# Patient Record
Sex: FEMALE | Race: WHITE | NOT HISPANIC OR LATINO | Employment: UNEMPLOYED | URBAN - METROPOLITAN AREA
[De-identification: names, ages, dates, MRNs, and addresses within clinical notes are randomized per-mention and may not be internally consistent; named-entity substitution may affect disease eponyms.]

---

## 2017-09-08 ENCOUNTER — HISTORICAL (OUTPATIENT)
Dept: RADIOLOGY | Facility: HOSPITAL | Age: 72
End: 2017-09-08

## 2017-09-22 ENCOUNTER — HISTORICAL (OUTPATIENT)
Dept: RADIOLOGY | Facility: HOSPITAL | Age: 72
End: 2017-09-22

## 2018-04-19 ENCOUNTER — HISTORICAL (OUTPATIENT)
Dept: RADIOLOGY | Facility: HOSPITAL | Age: 73
End: 2018-04-19

## 2018-10-12 ENCOUNTER — HISTORICAL (OUTPATIENT)
Dept: RADIOLOGY | Facility: HOSPITAL | Age: 73
End: 2018-10-12

## 2018-10-19 ENCOUNTER — HISTORICAL (OUTPATIENT)
Dept: LAB | Facility: HOSPITAL | Age: 73
End: 2018-10-19

## 2018-10-31 ENCOUNTER — HISTORICAL (OUTPATIENT)
Dept: RADIOLOGY | Facility: HOSPITAL | Age: 73
End: 2018-10-31

## 2018-11-02 ENCOUNTER — HISTORICAL (OUTPATIENT)
Dept: RADIOLOGY | Facility: HOSPITAL | Age: 73
End: 2018-11-02

## 2019-01-28 ENCOUNTER — HISTORICAL (OUTPATIENT)
Dept: RADIOLOGY | Facility: HOSPITAL | Age: 74
End: 2019-01-28

## 2020-01-30 ENCOUNTER — HISTORICAL (OUTPATIENT)
Dept: RADIOLOGY | Facility: HOSPITAL | Age: 75
End: 2020-01-30

## 2020-03-06 ENCOUNTER — HISTORICAL (OUTPATIENT)
Dept: RADIOLOGY | Facility: HOSPITAL | Age: 75
End: 2020-03-06

## 2020-11-02 ENCOUNTER — HISTORICAL (OUTPATIENT)
Dept: RADIOLOGY | Facility: HOSPITAL | Age: 75
End: 2020-11-02

## 2020-11-02 LAB
ALBUMIN SERPL-MCNC: 4 GM/DL (ref 3.4–4.8)
ALP SERPL-CCNC: 81 UNIT/L (ref 40–150)
ALT SERPL-CCNC: 90 UNIT/L (ref 0–55)
AST SERPL-CCNC: 97 UNIT/L (ref 5–34)
BILIRUB SERPL-MCNC: 0.5 MG/DL
BILIRUBIN DIRECT+TOT PNL SERPL-MCNC: 0.2 MG/DL (ref 0–0.5)
BILIRUBIN DIRECT+TOT PNL SERPL-MCNC: 0.3 MG/DL (ref 0–0.8)
PROT SERPL-MCNC: 7.9 GM/DL (ref 5.8–7.6)

## 2021-02-17 ENCOUNTER — HISTORICAL (OUTPATIENT)
Dept: RADIOLOGY | Facility: HOSPITAL | Age: 76
End: 2021-02-17

## 2021-08-05 ENCOUNTER — HISTORICAL (OUTPATIENT)
Dept: LAB | Facility: HOSPITAL | Age: 76
End: 2021-08-05

## 2021-08-09 ENCOUNTER — HISTORICAL (OUTPATIENT)
Dept: RADIOLOGY | Facility: HOSPITAL | Age: 76
End: 2021-08-09

## 2021-09-28 ENCOUNTER — HISTORICAL (OUTPATIENT)
Dept: RADIOLOGY | Facility: HOSPITAL | Age: 76
End: 2021-09-28

## 2022-04-07 ENCOUNTER — HISTORICAL (OUTPATIENT)
Dept: ADMINISTRATIVE | Facility: HOSPITAL | Age: 77
End: 2022-04-07

## 2022-07-26 ENCOUNTER — HOSPITAL ENCOUNTER (OUTPATIENT)
Dept: RADIOLOGY | Facility: HOSPITAL | Age: 77
Discharge: HOME | End: 2022-07-26
Attending: FAMILY MEDICINE
Payer: MEDICARE

## 2022-07-26 DIAGNOSIS — G89.29 CHRONIC BACK PAIN: ICD-10-CM

## 2022-07-26 DIAGNOSIS — M54.9 CHRONIC BACK PAIN: ICD-10-CM

## 2022-07-26 PROCEDURE — 72100 X-RAY EXAM L-S SPINE 2/3 VWS: CPT | Mod: TC

## 2022-07-26 PROCEDURE — 72070 X-RAY EXAM THORAC SPINE 2VWS: CPT | Mod: TC

## 2022-08-19 ENCOUNTER — HOSPITAL ENCOUNTER (OUTPATIENT)
Dept: RADIOLOGY | Facility: HOSPITAL | Age: 77
Discharge: HOME | End: 2022-08-19
Attending: FAMILY MEDICINE
Payer: MEDICARE

## 2022-08-19 DIAGNOSIS — Z00.00 ROUTINE CHECK-UP: ICD-10-CM

## 2022-08-19 PROCEDURE — 71046 X-RAY EXAM CHEST 2 VIEWS: CPT | Mod: TC

## 2023-11-24 ENCOUNTER — APPOINTMENT (OUTPATIENT)
Dept: LAB | Facility: HOSPITAL | Age: 78
End: 2023-11-24
Attending: FAMILY MEDICINE
Payer: MEDICARE

## 2023-11-24 DIAGNOSIS — N39.0 URINARY TRACT INFECTION, SITE NOT SPECIFIED: Primary | ICD-10-CM

## 2023-11-24 LAB
APPEARANCE UR: ABNORMAL
BACTERIA #/AREA URNS AUTO: ABNORMAL /HPF
BILIRUB UR QL STRIP.AUTO: NEGATIVE
COLOR UR AUTO: ABNORMAL
GLUCOSE UR QL STRIP.AUTO: NEGATIVE
KETONES UR QL STRIP.AUTO: ABNORMAL
LEUKOCYTE ESTERASE UR QL STRIP.AUTO: ABNORMAL
MUCOUS THREADS URNS QL MICRO: ABNORMAL /LPF
NITRITE UR QL STRIP.AUTO: NEGATIVE
PH UR STRIP.AUTO: 6 [PH]
PROT UR QL STRIP.AUTO: ABNORMAL
RBC #/AREA URNS AUTO: ABNORMAL /HPF
RBC UR QL AUTO: ABNORMAL
SP GR UR STRIP.AUTO: 1.01 (ref 1–1.03)
SQUAMOUS #/AREA URNS AUTO: ABNORMAL /HPF
UROBILINOGEN UR STRIP-ACNC: 0.2
WBC #/AREA URNS AUTO: >100 /HPF

## 2023-11-24 PROCEDURE — 81001 URINALYSIS AUTO W/SCOPE: CPT

## 2023-11-24 PROCEDURE — 87086 URINE CULTURE/COLONY COUNT: CPT

## 2023-11-26 LAB
BACTERIA UR CULT: ABNORMAL

## 2023-12-01 ENCOUNTER — HOSPITAL ENCOUNTER (INPATIENT)
Facility: HOSPITAL | Age: 78
LOS: 12 days | Discharge: HOSPICE | DRG: 689 | End: 2023-12-13
Attending: FAMILY MEDICINE | Admitting: FAMILY MEDICINE
Payer: MEDICARE

## 2023-12-01 DIAGNOSIS — N39.0 UTI (URINARY TRACT INFECTION): ICD-10-CM

## 2023-12-01 DIAGNOSIS — R41.82 MENTAL STATUS ALTERATION: Primary | ICD-10-CM

## 2023-12-01 DIAGNOSIS — I10 PRIMARY HYPERTENSION: ICD-10-CM

## 2023-12-01 DIAGNOSIS — R07.9 CHEST PAIN: ICD-10-CM

## 2023-12-01 LAB
ALBUMIN SERPL-MCNC: 3.4 G/DL (ref 3.4–4.8)
ALBUMIN/GLOB SERPL: 0.7 RATIO (ref 1.1–2)
ALP SERPL-CCNC: 117 UNIT/L (ref 40–150)
ALT SERPL-CCNC: 61 UNIT/L (ref 0–55)
APPEARANCE UR: ABNORMAL
AST SERPL-CCNC: 71 UNIT/L (ref 5–34)
BACTERIA #/AREA URNS AUTO: ABNORMAL /HPF
BASOPHILS # BLD AUTO: 0.02 X10(3)/MCL
BASOPHILS NFR BLD AUTO: 0.3 %
BILIRUB SERPL-MCNC: 0.4 MG/DL
BILIRUB UR QL STRIP.AUTO: NEGATIVE
BUN SERPL-MCNC: 25 MG/DL (ref 9.8–20.1)
CALCIUM SERPL-MCNC: 10.2 MG/DL (ref 8.4–10.2)
CHLORIDE SERPL-SCNC: 104 MMOL/L (ref 98–107)
CO2 SERPL-SCNC: 27 MMOL/L (ref 23–31)
COLOR UR AUTO: ABNORMAL
CREAT SERPL-MCNC: 1.2 MG/DL (ref 0.55–1.02)
EOSINOPHIL # BLD AUTO: 0.16 X10(3)/MCL (ref 0–0.9)
EOSINOPHIL NFR BLD AUTO: 2.1 %
ERYTHROCYTE [DISTWIDTH] IN BLOOD BY AUTOMATED COUNT: 13.8 % (ref 11.5–17)
ERYTHROCYTE [SEDIMENTATION RATE] IN BLOOD: 23 MM/HR (ref 0–20)
FLUAV AG UPPER RESP QL IA.RAPID: NOT DETECTED
FLUBV AG UPPER RESP QL IA.RAPID: NOT DETECTED
GFR SERPLBLD CREATININE-BSD FMLA CKD-EPI: 46 MLS/MIN/1.73/M2
GLOBULIN SER-MCNC: 4.9 GM/DL (ref 2.4–3.5)
GLUCOSE SERPL-MCNC: 128 MG/DL (ref 82–115)
GLUCOSE UR QL STRIP.AUTO: NEGATIVE
HCT VFR BLD AUTO: 41.4 % (ref 37–47)
HGB BLD-MCNC: 13.3 G/DL (ref 12–16)
IMM GRANULOCYTES # BLD AUTO: 0.02 X10(3)/MCL (ref 0–0.04)
IMM GRANULOCYTES NFR BLD AUTO: 0.3 %
KETONES UR QL STRIP.AUTO: ABNORMAL
LEUKOCYTE ESTERASE UR QL STRIP.AUTO: ABNORMAL
LYMPHOCYTES # BLD AUTO: 2.15 X10(3)/MCL (ref 0.6–4.6)
LYMPHOCYTES NFR BLD AUTO: 28.5 %
MAGNESIUM SERPL-MCNC: 1.9 MG/DL (ref 1.6–2.6)
MCH RBC QN AUTO: 30 PG (ref 27–31)
MCHC RBC AUTO-ENTMCNC: 32.1 G/DL (ref 33–36)
MCV RBC AUTO: 93.5 FL (ref 80–94)
MONOCYTES # BLD AUTO: 0.51 X10(3)/MCL (ref 0.1–1.3)
MONOCYTES NFR BLD AUTO: 6.8 %
MUCOUS THREADS URNS QL MICRO: ABNORMAL /LPF
NEUTROPHILS # BLD AUTO: 4.68 X10(3)/MCL (ref 2.1–9.2)
NEUTROPHILS NFR BLD AUTO: 62 %
NITRITE UR QL STRIP.AUTO: NEGATIVE
PH UR STRIP.AUTO: 6 [PH]
PHOSPHATE SERPL-MCNC: 3.9 MG/DL (ref 2.3–4.7)
PLATELET # BLD AUTO: 195 X10(3)/MCL (ref 130–400)
PMV BLD AUTO: 10 FL (ref 7.4–10.4)
POCT GLUCOSE: 132 MG/DL (ref 70–110)
POTASSIUM SERPL-SCNC: 4 MMOL/L (ref 3.5–5.1)
PROT SERPL-MCNC: 8.3 GM/DL (ref 5.8–7.6)
PROT UR QL STRIP.AUTO: ABNORMAL
RBC # BLD AUTO: 4.43 X10(6)/MCL (ref 4.2–5.4)
RBC #/AREA URNS AUTO: ABNORMAL /HPF
RBC UR QL AUTO: ABNORMAL
SARS-COV-2 RNA RESP QL NAA+PROBE: NOT DETECTED
SODIUM SERPL-SCNC: 143 MMOL/L (ref 136–145)
SP GR UR STRIP.AUTO: 1.02 (ref 1–1.03)
SQUAMOUS #/AREA URNS AUTO: ABNORMAL /HPF
T4 FREE SERPL-MCNC: 0.96 NG/DL (ref 0.7–1.48)
TSH SERPL-ACNC: 2.38 UIU/ML (ref 0.35–4.94)
UROBILINOGEN UR STRIP-ACNC: 0.2
WBC # SPEC AUTO: 7.54 X10(3)/MCL (ref 4.5–11.5)
WBC #/AREA URNS AUTO: ABNORMAL /HPF

## 2023-12-01 PROCEDURE — 84100 ASSAY OF PHOSPHORUS: CPT | Performed by: FAMILY MEDICINE

## 2023-12-01 PROCEDURE — 83735 ASSAY OF MAGNESIUM: CPT | Performed by: FAMILY MEDICINE

## 2023-12-01 PROCEDURE — 81001 URINALYSIS AUTO W/SCOPE: CPT | Performed by: FAMILY MEDICINE

## 2023-12-01 PROCEDURE — 11000001 HC ACUTE MED/SURG PRIVATE ROOM

## 2023-12-01 PROCEDURE — 25000003 PHARM REV CODE 250: Performed by: FAMILY MEDICINE

## 2023-12-01 PROCEDURE — 80053 COMPREHEN METABOLIC PANEL: CPT | Performed by: FAMILY MEDICINE

## 2023-12-01 PROCEDURE — 84439 ASSAY OF FREE THYROXINE: CPT | Performed by: FAMILY MEDICINE

## 2023-12-01 PROCEDURE — 87086 URINE CULTURE/COLONY COUNT: CPT | Performed by: FAMILY MEDICINE

## 2023-12-01 PROCEDURE — 85025 COMPLETE CBC W/AUTO DIFF WBC: CPT | Performed by: FAMILY MEDICINE

## 2023-12-01 PROCEDURE — 0240U COVID/FLU A&B PCR: CPT | Performed by: FAMILY MEDICINE

## 2023-12-01 PROCEDURE — 85652 RBC SED RATE AUTOMATED: CPT | Performed by: FAMILY MEDICINE

## 2023-12-01 PROCEDURE — 63600175 PHARM REV CODE 636 W HCPCS: Performed by: FAMILY MEDICINE

## 2023-12-01 PROCEDURE — 84443 ASSAY THYROID STIM HORMONE: CPT | Performed by: FAMILY MEDICINE

## 2023-12-01 PROCEDURE — 84134 ASSAY OF PREALBUMIN: CPT | Performed by: FAMILY MEDICINE

## 2023-12-01 RX ORDER — IBUPROFEN 200 MG
24 TABLET ORAL
Status: DISCONTINUED | OUTPATIENT
Start: 2023-12-01 | End: 2023-12-09

## 2023-12-01 RX ORDER — SODIUM CHLORIDE 0.9 % (FLUSH) 0.9 %
10 SYRINGE (ML) INJECTION EVERY 12 HOURS PRN
Status: DISCONTINUED | OUTPATIENT
Start: 2023-12-01 | End: 2023-12-13 | Stop reason: HOSPADM

## 2023-12-01 RX ORDER — DONEPEZIL HYDROCHLORIDE 10 MG/1
10 TABLET, FILM COATED ORAL NIGHTLY
COMMUNITY

## 2023-12-01 RX ORDER — ATORVASTATIN CALCIUM 10 MG/1
10 TABLET, FILM COATED ORAL NIGHTLY
Status: ON HOLD | COMMUNITY
End: 2023-12-13 | Stop reason: HOSPADM

## 2023-12-01 RX ORDER — ACETAMINOPHEN 325 MG/1
650 TABLET ORAL EVERY 4 HOURS PRN
Status: DISCONTINUED | OUTPATIENT
Start: 2023-12-01 | End: 2023-12-13 | Stop reason: HOSPADM

## 2023-12-01 RX ORDER — IBUPROFEN 200 MG
24 TABLET ORAL
Status: DISCONTINUED | OUTPATIENT
Start: 2023-12-01 | End: 2023-12-01

## 2023-12-01 RX ORDER — SODIUM CHLORIDE 0.9 % (FLUSH) 0.9 %
10 SYRINGE (ML) INJECTION EVERY 12 HOURS PRN
Status: DISCONTINUED | OUTPATIENT
Start: 2023-12-01 | End: 2023-12-01

## 2023-12-01 RX ORDER — NALOXONE HCL 0.4 MG/ML
0.02 VIAL (ML) INJECTION
Status: DISCONTINUED | OUTPATIENT
Start: 2023-12-01 | End: 2023-12-01

## 2023-12-01 RX ORDER — INSULIN ASPART 100 [IU]/ML
0-10 INJECTION, SOLUTION INTRAVENOUS; SUBCUTANEOUS
Status: DISCONTINUED | OUTPATIENT
Start: 2023-12-01 | End: 2023-12-09

## 2023-12-01 RX ORDER — FAMOTIDINE 20 MG/1
20 TABLET, FILM COATED ORAL 2 TIMES DAILY
Status: DISCONTINUED | OUTPATIENT
Start: 2023-12-01 | End: 2023-12-04

## 2023-12-01 RX ORDER — QUETIAPINE FUMARATE 25 MG/1
25 TABLET, FILM COATED ORAL DAILY
Status: ON HOLD | COMMUNITY
End: 2023-12-13 | Stop reason: HOSPADM

## 2023-12-01 RX ORDER — GABAPENTIN 300 MG/1
300 CAPSULE ORAL 2 TIMES DAILY
Status: ON HOLD | COMMUNITY
End: 2023-12-13 | Stop reason: HOSPADM

## 2023-12-01 RX ORDER — MUPIROCIN 20 MG/G
OINTMENT TOPICAL 2 TIMES DAILY
Status: DISPENSED | OUTPATIENT
Start: 2023-12-01 | End: 2023-12-06

## 2023-12-01 RX ORDER — LINEZOLID 2 MG/ML
600 INJECTION, SOLUTION INTRAVENOUS
Status: DISCONTINUED | OUTPATIENT
Start: 2023-12-01 | End: 2023-12-09

## 2023-12-01 RX ORDER — NALOXONE HCL 0.4 MG/ML
0.02 VIAL (ML) INJECTION
Status: DISCONTINUED | OUTPATIENT
Start: 2023-12-01 | End: 2023-12-13 | Stop reason: HOSPADM

## 2023-12-01 RX ORDER — ACETAMINOPHEN 325 MG/1
650 TABLET ORAL EVERY 8 HOURS PRN
Status: DISCONTINUED | OUTPATIENT
Start: 2023-12-01 | End: 2023-12-13 | Stop reason: HOSPADM

## 2023-12-01 RX ORDER — DOXAZOSIN 2 MG/1
2 TABLET ORAL NIGHTLY
Status: DISCONTINUED | OUTPATIENT
Start: 2023-12-01 | End: 2023-12-02

## 2023-12-01 RX ORDER — IBUPROFEN 200 MG
16 TABLET ORAL
Status: DISCONTINUED | OUTPATIENT
Start: 2023-12-01 | End: 2023-12-09

## 2023-12-01 RX ORDER — IBUPROFEN 200 MG
16 TABLET ORAL
Status: DISCONTINUED | OUTPATIENT
Start: 2023-12-01 | End: 2023-12-01

## 2023-12-01 RX ORDER — MECLIZINE HCL 25MG 25 MG/1
25 TABLET, CHEWABLE ORAL 3 TIMES DAILY PRN
Status: ON HOLD | COMMUNITY
Start: 2023-08-09 | End: 2023-12-13 | Stop reason: HOSPADM

## 2023-12-01 RX ORDER — GLUCAGON 1 MG
1 KIT INJECTION
Status: DISCONTINUED | OUTPATIENT
Start: 2023-12-01 | End: 2023-12-09

## 2023-12-01 RX ORDER — FOLIC ACID 1 MG/1
1 TABLET ORAL DAILY
Status: DISCONTINUED | OUTPATIENT
Start: 2023-12-02 | End: 2023-12-04

## 2023-12-01 RX ORDER — QUETIAPINE FUMARATE 100 MG/1
100 TABLET, FILM COATED ORAL NIGHTLY
Status: DISCONTINUED | OUTPATIENT
Start: 2023-12-01 | End: 2023-12-02

## 2023-12-01 RX ORDER — VALSARTAN 80 MG/1
80 TABLET ORAL DAILY
Status: DISCONTINUED | OUTPATIENT
Start: 2023-12-02 | End: 2023-12-02

## 2023-12-01 RX ORDER — NAPROXEN SODIUM 220 MG/1
81 TABLET, FILM COATED ORAL DAILY
Status: DISCONTINUED | OUTPATIENT
Start: 2023-12-02 | End: 2023-12-13 | Stop reason: HOSPADM

## 2023-12-01 RX ORDER — ONDANSETRON 4 MG/1
4 TABLET, ORALLY DISINTEGRATING ORAL EVERY 6 HOURS PRN
COMMUNITY
Start: 2023-08-09

## 2023-12-01 RX ADMIN — FAMOTIDINE 20 MG: 20 TABLET ORAL at 09:12

## 2023-12-01 RX ADMIN — DOXAZOSIN 2 MG: 2 TABLET ORAL at 09:12

## 2023-12-01 RX ADMIN — LINEZOLID 600 MG: 600 INJECTION, SOLUTION INTRAVENOUS at 06:12

## 2023-12-01 RX ADMIN — MUPIROCIN 1 G: 20 OINTMENT TOPICAL at 09:12

## 2023-12-02 LAB
POCT GLUCOSE: 147 MG/DL (ref 70–110)
POCT GLUCOSE: 204 MG/DL (ref 70–110)
POCT GLUCOSE: 206 MG/DL (ref 70–110)
PREALB SERPL-MCNC: 15.6 MG/DL (ref 14–37)

## 2023-12-02 PROCEDURE — 11000001 HC ACUTE MED/SURG PRIVATE ROOM

## 2023-12-02 PROCEDURE — 25000003 PHARM REV CODE 250: Performed by: FAMILY MEDICINE

## 2023-12-02 PROCEDURE — 63600175 PHARM REV CODE 636 W HCPCS: Performed by: FAMILY MEDICINE

## 2023-12-02 PROCEDURE — 82533 TOTAL CORTISOL: CPT | Performed by: FAMILY MEDICINE

## 2023-12-02 RX ORDER — SERTRALINE HYDROCHLORIDE 50 MG/1
150 TABLET, FILM COATED ORAL DAILY
Status: DISCONTINUED | OUTPATIENT
Start: 2023-12-02 | End: 2023-12-13 | Stop reason: HOSPADM

## 2023-12-02 RX ORDER — DOXAZOSIN 2 MG/1
2 TABLET ORAL NIGHTLY
Status: DISCONTINUED | OUTPATIENT
Start: 2023-12-02 | End: 2023-12-13 | Stop reason: HOSPADM

## 2023-12-02 RX ORDER — DIAZEPAM 10 MG/2ML
5 INJECTION INTRAMUSCULAR EVERY 4 HOURS PRN
Status: DISCONTINUED | OUTPATIENT
Start: 2023-12-02 | End: 2023-12-13 | Stop reason: HOSPADM

## 2023-12-02 RX ORDER — VALSARTAN 80 MG/1
80 TABLET ORAL DAILY
Status: DISCONTINUED | OUTPATIENT
Start: 2023-12-03 | End: 2023-12-13 | Stop reason: HOSPADM

## 2023-12-02 RX ADMIN — INSULIN DETEMIR 10 UNITS: 100 INJECTION, SOLUTION SUBCUTANEOUS at 09:12

## 2023-12-02 RX ADMIN — LINEZOLID 600 MG: 600 INJECTION, SOLUTION INTRAVENOUS at 06:12

## 2023-12-02 RX ADMIN — INSULIN ASPART 4 UNITS: 100 INJECTION, SOLUTION INTRAVENOUS; SUBCUTANEOUS at 06:12

## 2023-12-02 RX ADMIN — ASPIRIN 81 MG CHEWABLE TABLET 81 MG: 81 TABLET CHEWABLE at 09:12

## 2023-12-02 RX ADMIN — FAMOTIDINE 20 MG: 20 TABLET ORAL at 09:12

## 2023-12-02 RX ADMIN — LINEZOLID 600 MG: 600 INJECTION, SOLUTION INTRAVENOUS at 05:12

## 2023-12-02 RX ADMIN — FOLIC ACID 1 MG: 1 TABLET ORAL at 09:12

## 2023-12-02 RX ADMIN — MUPIROCIN 1 G: 20 OINTMENT TOPICAL at 09:12

## 2023-12-02 RX ADMIN — INSULIN ASPART 2 UNITS: 100 INJECTION, SOLUTION INTRAVENOUS; SUBCUTANEOUS at 09:12

## 2023-12-02 NOTE — H&P
[facility]                     [address] [facility] [address]    PATIENT NAME:       MANDI YODER  YOB: 1945  CSN:                [id number]   MRN:                [id number]  ADMIT DATE:         12/01/2023 15:09:00  PHYSICIAN:          Adria Reyes MD                        HISTORY AND PHYSICAL      HISTORY OF PRESENT ILLNESS:  She presented to my office with her son.  She has   been having decline in mental status and decline in physical status as well.    She has been having some blood in her urine for almost 2 weeks now.  We have   tried Cipro and her cultures on the chart did not show anything significant.    She is being admitted for workup and also treatment.    PAST MEDICAL HISTORY:  Includes anxiety and depression.  She has history of some   sort of female cancer.  She has cervical disk disorder, C4-5, C5-6, C6-7 with   radiculopathy.  She has contact dermatitis, history of pulmonary artery disease,   depression, diabetes type 2, hyperlipidemia, hypertension, hypothyroidism,   idiopathic chronic pancreatitis, moderate pulmonic valve insufficiency and   seasonal allergic rhinitis.  She has a thyroid disorder, trace mitral   regurgitation and tricuspid valve disorder as well.  She has history of   pneumocephalus, traumatic with a fracture of her skull in the past due to a   fall, dementia.    PAST SURGICAL HISTORY:  Includes back surgery on the lumbar spine, bladder   surgery in 2012, bypass surgery in 2004, colonoscopy in 2014, neck surgery in   2012, partial mastectomy of the right breast in 2013, and tonsillectomy.    MEDICATIONS:  Currently include;  1. Zofran 4 mg 1 q.4 hours p.r.n. nausea and vomiting.  2. Meclizine 25 mg 1 p.o. daily p.r.n. anxiety.  3. Aricept 10 mg 1 p.o. daily.  4. Aspirin 81 mg 1 p.o. daily.  5. Atorvastatin 10 mg 1 p.o. daily.  6. B complex vitamin 1 p.o. daily.  7.  Centrum Silver 1 p.o. daily.  8. Lotrisone cream as needed b.i.d.  9. Flonase 1 puff both nostrils twice a day.  10. Gabapentin 300 mg 2 tablets twice a day.  11. Lantus 10 units q.h.s.  12. Loratadine 10 mg 1 p.o. daily.  13. Metformin 500 mg 1 p.o. b.i.d.  14. Omeprazole 20 mg 1 p.o. daily.  15. Oxybutynin 5 mg twice a day.  16. She is also on Seroquel 50 mg at night and 25 mg in the morning.  17. Zoloft 100 mg 1-1/2 p.o. q.h.s.  18. Vitamin B12 1000 mcg 1 p.o. daily.  19. Vitamin C 1000 mg 1 p.o. daily.  20. Glimepiride 4 mg 1 p.o. daily.  21. Cipro 250 mg b.i.d.  22. Valium 5 mg half at bedtime.    FAMILY MEDICAL HISTORY:  Father had heart disease.  Brother had aneurysm.    Sister had kidney disease and aneurysms as well.    SOCIAL HISTORY:  No tobacco use.  She lives at home with her son.  No drugs.  No   alcohol.    ALLERGIES:  PENICILLIN.     CODE STATUS:  DNR.    IMMUNIZATIONS:  Up-to-date.    PHYSICAL EXAMINATION:  VITAL SIGNS:  Stable.  She is in a wheelchair, very cachectic, and confused.  HEENT:  Shows dry oral mucosa.  NECK:  Supple.  Full range of motion.  No significant adenopathy.  HEART:  Regular rate and rhythm.    LUNGS:  Clear to auscultation.  No respiratory distress.  ABDOMEN:  Soft and nontender.  EXTREMITIES:  No significant edema.  She does have significant amount of muscle   wasting.  :  Deferred at this time.  RECTAL:  Deferred at this time.  BREAST:  Deferred at this time.   NEUROLOGIC:  She is as stated above.  She does not follow commands.    ASSESSMENT:    1. Urinary tract infection with hematuria, suspect she may have some bleeding   vaginally also.  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  We will check with the son on this.  He   did not know of any cancers of her uterus, etc. at time of admit.  3. Decreased mental status with underlying dementia.  4. Severe debility.  5. Multiple other medical problems as per problem list.    PLAN:  Get baseline labs.  Get a urine and culture.   Start Zyvox and she is   allergic to penicillin and outpatient Cipro has not helped.  Follow up in the   morning.  Continue home medications.        ______________________________  Adria Reyes MD    PBS/AQS  DD:  12/01/2023  Time:  06:47PM  DT:  12/01/2023  Time:  08:32PM  Job #:  [id number]      HISTORY AND PHYSICAL

## 2023-12-02 NOTE — PLAN OF CARE
Problem: Adult Inpatient Plan of Care  Goal: Plan of Care Review  Outcome: Ongoing, Progressing  Goal: Patient-Specific Goal (Individualized)  Outcome: Ongoing, Progressing  Goal: Absence of Hospital-Acquired Illness or Injury  Outcome: Ongoing, Progressing  Goal: Optimal Comfort and Wellbeing  Outcome: Ongoing, Progressing  Goal: Readiness for Transition of Care  Outcome: Ongoing, Progressing     Problem: Fall Injury Risk  Goal: Absence of Fall and Fall-Related Injury  Outcome: Ongoing, Progressing     Problem: UTI (Urinary Tract Infection)  Goal: Improved Infection Symptoms  Outcome: Ongoing, Progressing     Problem: Pain Acute  Goal: Acceptable Pain Control and Functional Ability  Outcome: Ongoing, Progressing     Problem: Infection  Goal: Absence of Infection Signs and Symptoms  Outcome: Ongoing, Progressing     Problem: Skin Injury Risk Increased  Goal: Skin Health and Integrity  Outcome: Ongoing, Progressing

## 2023-12-03 LAB
ALBUMIN SERPL-MCNC: 3 G/DL (ref 3.4–4.8)
ALBUMIN/GLOB SERPL: 0.7 RATIO (ref 1.1–2)
ALP SERPL-CCNC: 99 UNIT/L (ref 40–150)
ALT SERPL-CCNC: 49 UNIT/L (ref 0–55)
AST SERPL-CCNC: 47 UNIT/L (ref 5–34)
BASOPHILS # BLD AUTO: 0.02 X10(3)/MCL
BASOPHILS NFR BLD AUTO: 0.2 %
BILIRUB SERPL-MCNC: 0.3 MG/DL
BUN SERPL-MCNC: 20 MG/DL (ref 9.8–20.1)
CALCIUM SERPL-MCNC: 9.4 MG/DL (ref 8.4–10.2)
CHLORIDE SERPL-SCNC: 103 MMOL/L (ref 98–107)
CO2 SERPL-SCNC: 26 MMOL/L (ref 23–31)
CREAT SERPL-MCNC: 1.07 MG/DL (ref 0.55–1.02)
EOSINOPHIL # BLD AUTO: 0.21 X10(3)/MCL (ref 0–0.9)
EOSINOPHIL NFR BLD AUTO: 2.4 %
ERYTHROCYTE [DISTWIDTH] IN BLOOD BY AUTOMATED COUNT: 13.8 % (ref 11.5–17)
GFR SERPLBLD CREATININE-BSD FMLA CKD-EPI: 53 MLS/MIN/1.73/M2
GLOBULIN SER-MCNC: 4.5 GM/DL (ref 2.4–3.5)
GLUCOSE SERPL-MCNC: 134 MG/DL (ref 82–115)
HCT VFR BLD AUTO: 39.6 % (ref 37–47)
HGB BLD-MCNC: 12.7 G/DL (ref 12–16)
IMM GRANULOCYTES # BLD AUTO: 0.03 X10(3)/MCL (ref 0–0.04)
IMM GRANULOCYTES NFR BLD AUTO: 0.3 %
LYMPHOCYTES # BLD AUTO: 1.35 X10(3)/MCL (ref 0.6–4.6)
LYMPHOCYTES NFR BLD AUTO: 15.1 %
MAGNESIUM SERPL-MCNC: 1.7 MG/DL (ref 1.6–2.6)
MCH RBC QN AUTO: 30 PG (ref 27–31)
MCHC RBC AUTO-ENTMCNC: 32.1 G/DL (ref 33–36)
MCV RBC AUTO: 93.4 FL (ref 80–94)
MONOCYTES # BLD AUTO: 0.62 X10(3)/MCL (ref 0.1–1.3)
MONOCYTES NFR BLD AUTO: 6.9 %
NEUTROPHILS # BLD AUTO: 6.7 X10(3)/MCL (ref 2.1–9.2)
NEUTROPHILS NFR BLD AUTO: 75.1 %
PHOSPHATE SERPL-MCNC: 4 MG/DL (ref 2.3–4.7)
PLATELET # BLD AUTO: 173 X10(3)/MCL (ref 130–400)
PMV BLD AUTO: 10.2 FL (ref 7.4–10.4)
POCT GLUCOSE: 111 MG/DL (ref 70–110)
POCT GLUCOSE: 135 MG/DL (ref 70–110)
POCT GLUCOSE: 145 MG/DL (ref 70–110)
POTASSIUM SERPL-SCNC: 3.5 MMOL/L (ref 3.5–5.1)
PROT SERPL-MCNC: 7.5 GM/DL (ref 5.8–7.6)
RBC # BLD AUTO: 4.24 X10(6)/MCL (ref 4.2–5.4)
SODIUM SERPL-SCNC: 140 MMOL/L (ref 136–145)
WBC # SPEC AUTO: 8.93 X10(3)/MCL (ref 4.5–11.5)

## 2023-12-03 PROCEDURE — 99900035 HC TECH TIME PER 15 MIN (STAT)

## 2023-12-03 PROCEDURE — 84100 ASSAY OF PHOSPHORUS: CPT | Performed by: FAMILY MEDICINE

## 2023-12-03 PROCEDURE — 80053 COMPREHEN METABOLIC PANEL: CPT | Performed by: FAMILY MEDICINE

## 2023-12-03 PROCEDURE — 85025 COMPLETE CBC W/AUTO DIFF WBC: CPT | Performed by: FAMILY MEDICINE

## 2023-12-03 PROCEDURE — 94761 N-INVAS EAR/PLS OXIMETRY MLT: CPT

## 2023-12-03 PROCEDURE — 63600175 PHARM REV CODE 636 W HCPCS: Performed by: FAMILY MEDICINE

## 2023-12-03 PROCEDURE — 25000003 PHARM REV CODE 250: Performed by: FAMILY MEDICINE

## 2023-12-03 PROCEDURE — 11000001 HC ACUTE MED/SURG PRIVATE ROOM

## 2023-12-03 PROCEDURE — 83735 ASSAY OF MAGNESIUM: CPT | Performed by: FAMILY MEDICINE

## 2023-12-03 RX ADMIN — LINEZOLID 600 MG: 600 INJECTION, SOLUTION INTRAVENOUS at 06:12

## 2023-12-03 RX ADMIN — LINEZOLID 600 MG: 600 INJECTION, SOLUTION INTRAVENOUS at 05:12

## 2023-12-03 RX ADMIN — FAMOTIDINE 20 MG: 20 TABLET ORAL at 09:12

## 2023-12-03 RX ADMIN — MUPIROCIN 1 G: 20 OINTMENT TOPICAL at 09:12

## 2023-12-03 RX ADMIN — FOLIC ACID 1 MG: 1 TABLET ORAL at 09:12

## 2023-12-03 NOTE — PROGRESS NOTES
[facility]                     [address] [facility] [address]    PATIENT NAME:       MANDI YODER  YOB: 1945  CSN:                [id number]   MRN:                [id number]  ADMIT DATE:         12/01/2023 15:09:00  PHYSICIAN:          Adria Reyes MD                            PROGRESS NOTE    DATE:      SUBJECTIVE:  Admitted yesterday with hematuria and UTI with decreased mental   status.  She has underlying dementia.  She would be eating however, and her   mental status was worsened normal.  She was hallucinating.    PHYSICAL EXAMINATION:  GENERAL:  Today, shows that she is afebrile.  She is very confused.  She does   open her eyes.  VITAL SIGNS:  Stable.  Pulse ox 95% on room air.    HEART:  Regular rate and rhythm.    LUNGS:  Clear to auscultation bilaterally.    ABDOMEN:  Nontender, nondistended, normoactive bowel sounds.  EXTREMITIES:  Wasted musculature and she is very thin.    She has a Evans catheter and she has bloody urine in the bag.  Cultures of her   urinalysis are pending, but the urinalysis does look infected.  She is on Zyvox   currently.    ASSESSMENT:    1. End-stage dementia.  Family is well aware that we are not going to cure this.  2. Urinary tract infection with hematuria.  Culture pending.  3. Malnutrition.  4. Diminished mental status.  5. Multiple other medical problems as per problem list.    PLAN:  Continue current care and follow up in the morning.  We will recheck a.m.   labs.        ______________________________  Adria Reyes MD    PBS/AQS  DD:  12/03/2023  Time:  11:05AM  DT:  12/03/2023  Time:  01:11PM  Job #:  [id number]      PROGRESS NOTE

## 2023-12-03 NOTE — PROGRESS NOTES
[facility]                     [address] [facility] [address]    PATIENT NAME:       MANDI YODER  YOB: 1945  CSN:                [id number]   MRN:                [id number]  ADMIT DATE:         12/01/2023 15:09:00  PHYSICIAN:          Adria Reyes MD                            PROGRESS NOTE    DATE:      SUBJECTIVE:  She is an elderly female with end-stage dementia.  She was admitted   directly for hematuria secondary to UTI, failing outpatient treatment.  We had   tried Cipro, but we were unable to get an adequate culture.  We repeated a   culture and it is pending.  She had some left hand swelling and x-ray was done   yesterday.  It does show a nondisplaced metatarsal fracture of the 3rd   metatarsal.  Her hand is wrapped.  Nurse and family states they can't arouse the   patient.  She does open her eyes occasionally, but mainly seems to be sleeping   very peacefully.    PHYSICAL EXAMINATION:  GENERAL:  She is elderly lying on her side in bed, very comfortable appearing.    She does open her eyes, but is disoriented.   VITAL SIGNS:  The patient's blood pressure is 156/89, pulse ox 92% on room air,   pulse is 101, Accu-Chek 145, and her temperature is afebrile.  HEART:  Regular rate and rhythm.    LUNGS:  Clear to auscultation bilaterally.    ABDOMEN:  Nontender.  EXTREMITIES:  No significant edema.  She does have a Evans catheter and urine is   less red.    Labs, x-rays, medications are on the chart and reviewed.    ASSESSMENT:    1. Urinary tract infection with hematuria.  2. Diminished mental status.  3. Underlying end-stage dementia.  4. Hypertension.  5. Malnutrition.  6. Low potassium.  7. Third metatarsal fracture, left hand.  8. Severe debility.    PLAN:  I discussed the case with family and they agree that they want her to be   a DNR.  We will keep her comfortable, and most likely once we get on  top of this   infection of her urine, she will begin to respond more again.        ______________________________  MD DASHA Khanna/SUSAN  DD:  12/03/2023  Time:  02:52PM  DT:  12/03/2023  Time:  03:11PM  Job #:  [id number]      PROGRESS NOTE

## 2023-12-04 LAB
ALLENS TEST: ABNORMAL
AMMONIA PLAS-MSCNC: 23 UMOL/L (ref 18–72)
APPEARANCE UR: ABNORMAL
BACTERIA #/AREA URNS AUTO: ABNORMAL /HPF
BACTERIA UR CULT: NO GROWTH
BILIRUB UR QL STRIP.AUTO: ABNORMAL
COLOR UR AUTO: ABNORMAL
GLUCOSE UR QL STRIP.AUTO: ABNORMAL
HCO3 UR-SCNC: 24.8 MMOL/L (ref 24–28)
KETONES UR QL STRIP.AUTO: ABNORMAL
LEUKOCYTE ESTERASE UR QL STRIP.AUTO: ABNORMAL
MUCOUS THREADS URNS QL MICRO: ABNORMAL /LPF
NITRITE UR QL STRIP.AUTO: NEGATIVE
PCO2 BLDA: 34.5 MMHG (ref 35–45)
PH SMN: 7.46 [PH] (ref 7.35–7.45)
PH UR STRIP.AUTO: 6 [PH]
PO2 BLDA: 77 MMHG (ref 80–100)
POC BE: 1 MMOL/L
POC SATURATED O2: 96 % (ref 95–100)
POC TCO2: 26 MMOL/L (ref 23–27)
POCT GLUCOSE: 181 MG/DL (ref 70–110)
POCT GLUCOSE: 190 MG/DL (ref 70–110)
PROT UR QL STRIP.AUTO: ABNORMAL
RBC #/AREA URNS AUTO: >100 /HPF
RBC UR QL AUTO: ABNORMAL
SAMPLE: ABNORMAL
SITE: ABNORMAL
SP GR UR STRIP.AUTO: 1.02 (ref 1–1.03)
SQUAMOUS #/AREA URNS AUTO: ABNORMAL /HPF
UROBILINOGEN UR STRIP-ACNC: 0.2
WBC #/AREA URNS AUTO: ABNORMAL /HPF

## 2023-12-04 PROCEDURE — 25000003 PHARM REV CODE 250: Performed by: FAMILY MEDICINE

## 2023-12-04 PROCEDURE — 99900035 HC TECH TIME PER 15 MIN (STAT)

## 2023-12-04 PROCEDURE — 76937 US GUIDE VASCULAR ACCESS: CPT

## 2023-12-04 PROCEDURE — 82140 ASSAY OF AMMONIA: CPT | Performed by: FAMILY MEDICINE

## 2023-12-04 PROCEDURE — C1751 CATH, INF, PER/CENT/MIDLINE: HCPCS

## 2023-12-04 PROCEDURE — 36573 INSJ PICC RS&I 5 YR+: CPT

## 2023-12-04 PROCEDURE — A4216 STERILE WATER/SALINE, 10 ML: HCPCS | Performed by: FAMILY MEDICINE

## 2023-12-04 PROCEDURE — 36600 WITHDRAWAL OF ARTERIAL BLOOD: CPT

## 2023-12-04 PROCEDURE — 63600175 PHARM REV CODE 636 W HCPCS: Performed by: FAMILY MEDICINE

## 2023-12-04 PROCEDURE — 87040 BLOOD CULTURE FOR BACTERIA: CPT | Performed by: FAMILY MEDICINE

## 2023-12-04 PROCEDURE — 11000001 HC ACUTE MED/SURG PRIVATE ROOM

## 2023-12-04 PROCEDURE — 81001 URINALYSIS AUTO W/SCOPE: CPT | Performed by: FAMILY MEDICINE

## 2023-12-04 PROCEDURE — 94761 N-INVAS EAR/PLS OXIMETRY MLT: CPT | Mod: XB

## 2023-12-04 PROCEDURE — 82803 BLOOD GASES ANY COMBINATION: CPT

## 2023-12-04 RX ORDER — SODIUM CHLORIDE 0.9 % (FLUSH) 0.9 %
10 SYRINGE (ML) INJECTION EVERY 6 HOURS
Status: DISCONTINUED | OUTPATIENT
Start: 2023-12-05 | End: 2023-12-13 | Stop reason: HOSPADM

## 2023-12-04 RX ORDER — CLONIDINE 0.2 MG/24H
1 PATCH, EXTENDED RELEASE TRANSDERMAL
Status: DISCONTINUED | OUTPATIENT
Start: 2023-12-05 | End: 2023-12-10

## 2023-12-04 RX ORDER — GENTAMICIN SULFATE 40 MG/ML
4.2 INJECTION, SOLUTION INTRAMUSCULAR; INTRAVENOUS ONCE
Status: DISCONTINUED | OUTPATIENT
Start: 2023-12-04 | End: 2023-12-04

## 2023-12-04 RX ORDER — SODIUM CHLORIDE 0.9 % (FLUSH) 0.9 %
10 SYRINGE (ML) INJECTION
Status: DISCONTINUED | OUTPATIENT
Start: 2023-12-04 | End: 2023-12-13 | Stop reason: HOSPADM

## 2023-12-04 RX ORDER — FAMOTIDINE 10 MG/ML
20 INJECTION INTRAVENOUS 2 TIMES DAILY
Status: DISCONTINUED | OUTPATIENT
Start: 2023-12-04 | End: 2023-12-13 | Stop reason: HOSPADM

## 2023-12-04 RX ORDER — METOPROLOL TARTRATE 1 MG/ML
5 INJECTION, SOLUTION INTRAVENOUS EVERY 6 HOURS
Status: DISCONTINUED | OUTPATIENT
Start: 2023-12-04 | End: 2023-12-13 | Stop reason: HOSPADM

## 2023-12-04 RX ORDER — ENOXAPARIN SODIUM 100 MG/ML
30 INJECTION SUBCUTANEOUS EVERY 24 HOURS
Status: DISCONTINUED | OUTPATIENT
Start: 2023-12-04 | End: 2023-12-09

## 2023-12-04 RX ADMIN — LEUCINE, PHENYLALANINE, LYSINE, METHIONINE, ISOLEUCINE, VALINE, HISTIDINE, THREONINE, TRYPTOPHAN, ALANINE, GLYCINE, ARGININE, PROLINE, SERINE, TYROSINE, SODIUM ACETATE, DIBASIC POTASSIUM PHOSPHATE, MAGNESIUM CHLORIDE, SODIUM CHLORIDE, CALCIUM CHLORIDE, DEXTROSE
365; 280; 290; 200; 300; 290; 240; 210; 90; 1035; 515; 575; 340; 250; 20; 340; 261; 51; 59; 33; 20 INJECTION INTRAVENOUS at 10:12

## 2023-12-04 RX ADMIN — LINEZOLID 600 MG: 600 INJECTION, SOLUTION INTRAVENOUS at 05:12

## 2023-12-04 RX ADMIN — MUPIROCIN 1 G: 20 OINTMENT TOPICAL at 10:12

## 2023-12-04 RX ADMIN — SODIUM CHLORIDE, PRESERVATIVE FREE 263.2 MG: 5 INJECTION INTRAVENOUS at 10:12

## 2023-12-04 RX ADMIN — FAMOTIDINE 20 MG: 10 INJECTION, SOLUTION INTRAVENOUS at 10:12

## 2023-12-04 RX ADMIN — ENOXAPARIN SODIUM 30 MG: 40 INJECTION SUBCUTANEOUS at 06:12

## 2023-12-04 RX ADMIN — INSULIN ASPART 1 UNITS: 100 INJECTION, SOLUTION INTRAVENOUS; SUBCUTANEOUS at 10:12

## 2023-12-04 RX ADMIN — METOPROLOL TARTRATE 5 MG: 1 INJECTION, SOLUTION INTRAVENOUS at 05:12

## 2023-12-04 RX ADMIN — LINEZOLID 600 MG: 600 INJECTION, SOLUTION INTRAVENOUS at 07:12

## 2023-12-04 RX ADMIN — MUPIROCIN: 20 OINTMENT TOPICAL at 09:12

## 2023-12-05 LAB
ALBUMIN SERPL-MCNC: 2.5 G/DL (ref 3.4–4.8)
ALBUMIN/GLOB SERPL: 0.5 RATIO (ref 1.1–2)
ALP SERPL-CCNC: 85 UNIT/L (ref 40–150)
ALT SERPL-CCNC: 38 UNIT/L (ref 0–55)
AST SERPL-CCNC: 32 UNIT/L (ref 5–34)
BASOPHILS # BLD AUTO: 0.02 X10(3)/MCL
BASOPHILS NFR BLD AUTO: 0.2 %
BILIRUB SERPL-MCNC: 0.6 MG/DL
BUN SERPL-MCNC: 21 MG/DL (ref 9.8–20.1)
CALCIUM SERPL-MCNC: 9.3 MG/DL (ref 8.4–10.2)
CHLORIDE SERPL-SCNC: 101 MMOL/L (ref 98–107)
CO2 SERPL-SCNC: 25 MMOL/L (ref 23–31)
CORTIS SERPL IA-MCNC: 31 MCG/DL
CREAT SERPL-MCNC: 0.95 MG/DL (ref 0.55–1.02)
EOSINOPHIL # BLD AUTO: 0.03 X10(3)/MCL (ref 0–0.9)
EOSINOPHIL NFR BLD AUTO: 0.3 %
ERYTHROCYTE [DISTWIDTH] IN BLOOD BY AUTOMATED COUNT: 13.7 % (ref 11.5–17)
GENTAMICIN SERPL-MCNC: 3.7 UG/ML
GFR SERPLBLD CREATININE-BSD FMLA CKD-EPI: >60 MLS/MIN/1.73/M2
GLOBULIN SER-MCNC: 4.9 GM/DL (ref 2.4–3.5)
GLUCOSE SERPL-MCNC: 253 MG/DL (ref 82–115)
HCT VFR BLD AUTO: 37.2 % (ref 37–47)
HGB BLD-MCNC: 12 G/DL (ref 12–16)
IMM GRANULOCYTES # BLD AUTO: 0.04 X10(3)/MCL (ref 0–0.04)
IMM GRANULOCYTES NFR BLD AUTO: 0.4 %
INR PPP: 1.3
LYMPHOCYTES # BLD AUTO: 1.33 X10(3)/MCL (ref 0.6–4.6)
LYMPHOCYTES NFR BLD AUTO: 12.9 %
MAGNESIUM SERPL-MCNC: 1.8 MG/DL (ref 1.6–2.6)
MCH RBC QN AUTO: 29.9 PG (ref 27–31)
MCHC RBC AUTO-ENTMCNC: 32.3 G/DL (ref 33–36)
MCV RBC AUTO: 92.8 FL (ref 80–94)
MONOCYTES # BLD AUTO: 0.86 X10(3)/MCL (ref 0.1–1.3)
MONOCYTES NFR BLD AUTO: 8.3 %
NEUTROPHILS # BLD AUTO: 8.07 X10(3)/MCL (ref 2.1–9.2)
NEUTROPHILS NFR BLD AUTO: 77.9 %
PHOSPHATE SERPL-MCNC: 2.1 MG/DL (ref 2.3–4.7)
PLATELET # BLD AUTO: 164 X10(3)/MCL (ref 130–400)
PMV BLD AUTO: 10.8 FL (ref 7.4–10.4)
POCT GLUCOSE: 260 MG/DL (ref 70–110)
POCT GLUCOSE: 272 MG/DL (ref 70–110)
POCT GLUCOSE: 283 MG/DL (ref 70–110)
POTASSIUM SERPL-SCNC: 2.7 MMOL/L (ref 3.5–5.1)
PREALB SERPL-MCNC: 9.1 MG/DL (ref 14–37)
PROT SERPL-MCNC: 7.4 GM/DL (ref 5.8–7.6)
PROTHROMBIN TIME: 16.1 SECONDS (ref 12.5–14.5)
RBC # BLD AUTO: 4.01 X10(6)/MCL (ref 4.2–5.4)
SODIUM SERPL-SCNC: 137 MMOL/L (ref 136–145)
T4 FREE SERPL-MCNC: 0.86 NG/DL (ref 0.7–1.48)
TRIGL SERPL-MCNC: 103 MG/DL (ref 37–140)
TSH SERPL-ACNC: 3.38 UIU/ML (ref 0.35–4.94)
WBC # SPEC AUTO: 10.35 X10(3)/MCL (ref 4.5–11.5)

## 2023-12-05 PROCEDURE — 63600175 PHARM REV CODE 636 W HCPCS: Performed by: FAMILY MEDICINE

## 2023-12-05 PROCEDURE — 83735 ASSAY OF MAGNESIUM: CPT | Performed by: FAMILY MEDICINE

## 2023-12-05 PROCEDURE — 25000003 PHARM REV CODE 250: Performed by: FAMILY MEDICINE

## 2023-12-05 PROCEDURE — 84443 ASSAY THYROID STIM HORMONE: CPT | Performed by: FAMILY MEDICINE

## 2023-12-05 PROCEDURE — 85025 COMPLETE CBC W/AUTO DIFF WBC: CPT | Performed by: FAMILY MEDICINE

## 2023-12-05 PROCEDURE — A4216 STERILE WATER/SALINE, 10 ML: HCPCS | Performed by: FAMILY MEDICINE

## 2023-12-05 PROCEDURE — 11000001 HC ACUTE MED/SURG PRIVATE ROOM

## 2023-12-05 PROCEDURE — B4185 PARENTERAL SOL 10 GM LIPIDS: HCPCS | Performed by: FAMILY MEDICINE

## 2023-12-05 PROCEDURE — 84439 ASSAY OF FREE THYROXINE: CPT | Performed by: FAMILY MEDICINE

## 2023-12-05 PROCEDURE — 80053 COMPREHEN METABOLIC PANEL: CPT | Performed by: FAMILY MEDICINE

## 2023-12-05 PROCEDURE — 94761 N-INVAS EAR/PLS OXIMETRY MLT: CPT

## 2023-12-05 PROCEDURE — 80170 ASSAY OF GENTAMICIN: CPT | Performed by: FAMILY MEDICINE

## 2023-12-05 PROCEDURE — 84478 ASSAY OF TRIGLYCERIDES: CPT | Performed by: FAMILY MEDICINE

## 2023-12-05 PROCEDURE — 84134 ASSAY OF PREALBUMIN: CPT | Performed by: FAMILY MEDICINE

## 2023-12-05 PROCEDURE — 84100 ASSAY OF PHOSPHORUS: CPT | Performed by: FAMILY MEDICINE

## 2023-12-05 PROCEDURE — 85610 PROTHROMBIN TIME: CPT | Performed by: FAMILY MEDICINE

## 2023-12-05 RX ADMIN — SODIUM CHLORIDE, PRESERVATIVE FREE 10 ML: 5 INJECTION INTRAVENOUS at 01:12

## 2023-12-05 RX ADMIN — SODIUM CHLORIDE, PRESERVATIVE FREE 10 ML: 5 INJECTION INTRAVENOUS at 06:12

## 2023-12-05 RX ADMIN — LEUCINE, PHENYLALANINE, LYSINE, METHIONINE, ISOLEUCINE, VALINE, HISTIDINE, THREONINE, TRYPTOPHAN, ALANINE, GLYCINE, ARGININE, PROLINE, SERINE, TYROSINE, SODIUM ACETATE, DIBASIC POTASSIUM PHOSPHATE, MAGNESIUM CHLORIDE, SODIUM CHLORIDE, CALCIUM CHLORIDE, DEXTROSE
365; 280; 290; 200; 300; 290; 240; 210; 90; 1035; 515; 575; 340; 250; 20; 340; 261; 51; 59; 33; 20 INJECTION INTRAVENOUS at 05:12

## 2023-12-05 RX ADMIN — FAMOTIDINE 20 MG: 10 INJECTION, SOLUTION INTRAVENOUS at 09:12

## 2023-12-05 RX ADMIN — METOPROLOL TARTRATE 5 MG: 1 INJECTION, SOLUTION INTRAVENOUS at 06:12

## 2023-12-05 RX ADMIN — METOPROLOL TARTRATE 5 MG: 1 INJECTION, SOLUTION INTRAVENOUS at 01:12

## 2023-12-05 RX ADMIN — MUPIROCIN 1 G: 20 OINTMENT TOPICAL at 09:12

## 2023-12-05 RX ADMIN — INSULIN ASPART 6 UNITS: 100 INJECTION, SOLUTION INTRAVENOUS; SUBCUTANEOUS at 10:12

## 2023-12-05 RX ADMIN — LINEZOLID 600 MG: 600 INJECTION, SOLUTION INTRAVENOUS at 06:12

## 2023-12-05 RX ADMIN — METOPROLOL TARTRATE 5 MG: 1 INJECTION, SOLUTION INTRAVENOUS at 11:12

## 2023-12-05 RX ADMIN — MUPIROCIN 1 G: 20 OINTMENT TOPICAL at 10:12

## 2023-12-05 RX ADMIN — CLONIDINE 1 PATCH: 0.2 PATCH TRANSDERMAL at 09:12

## 2023-12-05 RX ADMIN — METOPROLOL TARTRATE 5 MG: 1 INJECTION, SOLUTION INTRAVENOUS at 05:12

## 2023-12-05 RX ADMIN — ENOXAPARIN SODIUM 30 MG: 40 INJECTION SUBCUTANEOUS at 04:12

## 2023-12-05 RX ADMIN — INSULIN ASPART 6 UNITS: 100 INJECTION, SOLUTION INTRAVENOUS; SUBCUTANEOUS at 04:12

## 2023-12-05 RX ADMIN — GENTAMICIN SULFATE 210 MG: 40 INJECTION, SOLUTION INTRAMUSCULAR; INTRAVENOUS at 10:12

## 2023-12-05 RX ADMIN — LINEZOLID 600 MG: 600 INJECTION, SOLUTION INTRAVENOUS at 05:12

## 2023-12-05 RX ADMIN — I.V. FAT EMULSION 250 ML: 20 EMULSION INTRAVENOUS at 09:12

## 2023-12-05 RX ADMIN — INSULIN ASPART 3 UNITS: 100 INJECTION, SOLUTION INTRAVENOUS; SUBCUTANEOUS at 10:12

## 2023-12-05 NOTE — PROCEDURES
"Lilliam Quintanilla is a 78 y.o. female patient.    Temp: 97.3 °F (36.3 °C) (12/04/23 1955)  Pulse: 65 (12/04/23 1955)  Resp: 18 (12/04/23 1612)  BP: (!) 182/89 (12/04/23 1955)  SpO2: 95 % (12/04/23 2000)  Weight: 52.6 kg (115 lb 15.4 oz) (12/01/23 1550)  Height: 5' 4" (162.6 cm) (12/01/23 1550)    PICC  Date/Time: 12/4/2023 8:38 PM  Performed by: Harrison Davis RN  Consent Done: Yes  Time out: Immediately prior to procedure a time out was called to verify the correct patient, procedure, equipment, support staff and site/side marked as required  Indications: med administration and vascular access  Anesthesia: local infiltration  Local anesthetic: lidocaine 1% without epinephrine  Anesthetic Total (mL): 5  Preparation: skin prepped with ChloraPrep  Skin prep agent dried: skin prep agent completely dried prior to procedure  Sterile barriers: all five maximum sterile barriers used - cap, mask, sterile gown, sterile gloves, and large sterile sheet  Hand hygiene: hand hygiene performed prior to central venous catheter insertion  Location details: right brachial  Catheter type: double lumen  Catheter size: 5 Fr  Catheter Length: 36cm    Ultrasound guidance: yes  Vessel Caliber: medium and patentNeedle advanced into vessel with real time Ultrasound guidance.  Guidewire confirmed in vessel.  Sterile sheath used.  Number of attempts: 1  Post-procedure: blood return through all ports, sterile dressing applied and chlorhexidine patch    Assessment: free fluid flow          Name Harrison Davis RN  12/4/2023    "

## 2023-12-06 LAB
ALBUMIN SERPL-MCNC: 2.2 G/DL (ref 3.4–4.8)
ALBUMIN/GLOB SERPL: 0.4 RATIO (ref 1.1–2)
ALP SERPL-CCNC: 79 UNIT/L (ref 40–150)
ALT SERPL-CCNC: 28 UNIT/L (ref 0–55)
AST SERPL-CCNC: 22 UNIT/L (ref 5–34)
BASOPHILS # BLD AUTO: 0.02 X10(3)/MCL
BASOPHILS NFR BLD AUTO: 0.2 %
BILIRUB SERPL-MCNC: 0.5 MG/DL
BUN SERPL-MCNC: 22 MG/DL (ref 9.8–20.1)
CALCIUM SERPL-MCNC: 10.2 MG/DL (ref 8.4–10.2)
CHLORIDE SERPL-SCNC: 99 MMOL/L (ref 98–107)
CO2 SERPL-SCNC: 25 MMOL/L (ref 23–31)
CREAT SERPL-MCNC: 0.94 MG/DL (ref 0.55–1.02)
EOSINOPHIL # BLD AUTO: 0.07 X10(3)/MCL (ref 0–0.9)
EOSINOPHIL NFR BLD AUTO: 0.7 %
ERYTHROCYTE [DISTWIDTH] IN BLOOD BY AUTOMATED COUNT: 13.6 % (ref 11.5–17)
GENTAMICIN TROUGH SERPL-MCNC: <0.5 UG/ML (ref 0–2)
GFR SERPLBLD CREATININE-BSD FMLA CKD-EPI: >60 MLS/MIN/1.73/M2
GLOBULIN SER-MCNC: 5.3 GM/DL (ref 2.4–3.5)
GLUCOSE SERPL-MCNC: 357 MG/DL (ref 82–115)
HCT VFR BLD AUTO: 37.4 % (ref 37–47)
HGB BLD-MCNC: 12.1 G/DL (ref 12–16)
IMM GRANULOCYTES # BLD AUTO: 0.03 X10(3)/MCL (ref 0–0.04)
IMM GRANULOCYTES NFR BLD AUTO: 0.3 %
LYMPHOCYTES # BLD AUTO: 1.28 X10(3)/MCL (ref 0.6–4.6)
LYMPHOCYTES NFR BLD AUTO: 12.5 %
MAGNESIUM SERPL-MCNC: 1.8 MG/DL (ref 1.6–2.6)
MCH RBC QN AUTO: 29.5 PG (ref 27–31)
MCHC RBC AUTO-ENTMCNC: 32.4 G/DL (ref 33–36)
MCV RBC AUTO: 91.2 FL (ref 80–94)
MONOCYTES # BLD AUTO: 0.45 X10(3)/MCL (ref 0.1–1.3)
MONOCYTES NFR BLD AUTO: 4.4 %
NEUTROPHILS # BLD AUTO: 8.4 X10(3)/MCL (ref 2.1–9.2)
NEUTROPHILS NFR BLD AUTO: 81.9 %
PHOSPHATE SERPL-MCNC: 2.2 MG/DL (ref 2.3–4.7)
PLATELET # BLD AUTO: 180 X10(3)/MCL (ref 130–400)
PMV BLD AUTO: 10.4 FL (ref 7.4–10.4)
POCT GLUCOSE: 254 MG/DL (ref 70–110)
POCT GLUCOSE: 278 MG/DL (ref 70–110)
POCT GLUCOSE: 312 MG/DL (ref 70–110)
POTASSIUM SERPL-SCNC: 2.4 MMOL/L (ref 3.5–5.1)
PROT SERPL-MCNC: 7.5 GM/DL (ref 5.8–7.6)
RBC # BLD AUTO: 4.1 X10(6)/MCL (ref 4.2–5.4)
SODIUM SERPL-SCNC: 136 MMOL/L (ref 136–145)
WBC # SPEC AUTO: 10.25 X10(3)/MCL (ref 4.5–11.5)

## 2023-12-06 PROCEDURE — 84100 ASSAY OF PHOSPHORUS: CPT | Performed by: FAMILY MEDICINE

## 2023-12-06 PROCEDURE — 11000001 HC ACUTE MED/SURG PRIVATE ROOM

## 2023-12-06 PROCEDURE — A4216 STERILE WATER/SALINE, 10 ML: HCPCS | Performed by: FAMILY MEDICINE

## 2023-12-06 PROCEDURE — 25000003 PHARM REV CODE 250: Performed by: FAMILY MEDICINE

## 2023-12-06 PROCEDURE — 85025 COMPLETE CBC W/AUTO DIFF WBC: CPT | Performed by: FAMILY MEDICINE

## 2023-12-06 PROCEDURE — 63600175 PHARM REV CODE 636 W HCPCS: Performed by: FAMILY MEDICINE

## 2023-12-06 PROCEDURE — 80170 ASSAY OF GENTAMICIN: CPT | Performed by: FAMILY MEDICINE

## 2023-12-06 PROCEDURE — 83735 ASSAY OF MAGNESIUM: CPT | Performed by: FAMILY MEDICINE

## 2023-12-06 PROCEDURE — 80053 COMPREHEN METABOLIC PANEL: CPT | Performed by: FAMILY MEDICINE

## 2023-12-06 PROCEDURE — 94761 N-INVAS EAR/PLS OXIMETRY MLT: CPT

## 2023-12-06 RX ADMIN — FAMOTIDINE 20 MG: 10 INJECTION, SOLUTION INTRAVENOUS at 08:12

## 2023-12-06 RX ADMIN — METOPROLOL TARTRATE 5 MG: 1 INJECTION, SOLUTION INTRAVENOUS at 06:12

## 2023-12-06 RX ADMIN — SODIUM CHLORIDE, PRESERVATIVE FREE 10 ML: 5 INJECTION INTRAVENOUS at 05:12

## 2023-12-06 RX ADMIN — INSULIN ASPART 6 UNITS: 100 INJECTION, SOLUTION INTRAVENOUS; SUBCUTANEOUS at 05:12

## 2023-12-06 RX ADMIN — INSULIN ASPART 8 UNITS: 100 INJECTION, SOLUTION INTRAVENOUS; SUBCUTANEOUS at 06:12

## 2023-12-06 RX ADMIN — INSULIN ASPART 6 UNITS: 100 INJECTION, SOLUTION INTRAVENOUS; SUBCUTANEOUS at 11:12

## 2023-12-06 RX ADMIN — LINEZOLID 600 MG: 600 INJECTION, SOLUTION INTRAVENOUS at 05:12

## 2023-12-06 RX ADMIN — METOPROLOL TARTRATE 5 MG: 1 INJECTION, SOLUTION INTRAVENOUS at 11:12

## 2023-12-06 RX ADMIN — POTASSIUM PHOSPHATE, MONOBASIC AND POTASSIUM PHOSPHATE, DIBASIC 20 MMOL: 224; 236 INJECTION, SOLUTION, CONCENTRATE INTRAVENOUS at 07:12

## 2023-12-06 RX ADMIN — LEUCINE, PHENYLALANINE, LYSINE, METHIONINE, ISOLEUCINE, VALINE, HISTIDINE, THREONINE, TRYPTOPHAN, ALANINE, GLYCINE, ARGININE, PROLINE, SERINE, TYROSINE, SODIUM ACETATE, DIBASIC POTASSIUM PHOSPHATE, MAGNESIUM CHLORIDE, SODIUM CHLORIDE, CALCIUM CHLORIDE, DEXTROSE
365; 280; 290; 200; 300; 290; 240; 210; 90; 1035; 515; 575; 340; 250; 20; 340; 261; 51; 59; 33; 20 INJECTION INTRAVENOUS at 03:12

## 2023-12-06 RX ADMIN — ENOXAPARIN SODIUM 30 MG: 40 INJECTION SUBCUTANEOUS at 05:12

## 2023-12-06 RX ADMIN — FAMOTIDINE 20 MG: 10 INJECTION, SOLUTION INTRAVENOUS at 10:12

## 2023-12-06 RX ADMIN — METOPROLOL TARTRATE 5 MG: 1 INJECTION, SOLUTION INTRAVENOUS at 12:12

## 2023-12-06 RX ADMIN — GENTAMICIN SULFATE 210 MG: 40 INJECTION, SOLUTION INTRAMUSCULAR; INTRAVENOUS at 11:12

## 2023-12-06 RX ADMIN — SODIUM CHLORIDE, PRESERVATIVE FREE 10 ML: 5 INJECTION INTRAVENOUS at 11:12

## 2023-12-06 RX ADMIN — MUPIROCIN 1 G: 20 OINTMENT TOPICAL at 08:12

## 2023-12-06 RX ADMIN — METOPROLOL TARTRATE 5 MG: 1 INJECTION, SOLUTION INTRAVENOUS at 05:12

## 2023-12-06 RX ADMIN — SODIUM CHLORIDE, PRESERVATIVE FREE 10 ML: 5 INJECTION INTRAVENOUS at 12:12

## 2023-12-06 RX ADMIN — LINEZOLID 600 MG: 600 INJECTION, SOLUTION INTRAVENOUS at 06:12

## 2023-12-06 NOTE — PROGRESS NOTES
[facility]                     [address] [facility] [address]    PATIENT NAME:       MANDI YODER  YOB: 1945  CSN:                [id number]   MRN:                [id number]  ADMIT DATE:         12/01/2023 15:09:00  PHYSICIAN:          Adria Reyes MD                            PROGRESS NOTE    DATE:  12/04/2023 00:00:00    SUBJECTIVE:  She was admitted secondary to urinary tract infection.  She has UTI   with SIRS and she required IV antibiotics for this.  She had hematuria and a   Evans catheter was placed.  Unfortunately, the specimen from the culture   returned contaminated.  We will have to do this again.  She has high blood   pressure, however, she is not eating or able to take her medication.  She is   just sleeping.  She is arousable, but disoriented.    PHYSICAL EXAMINATION:  VITAL SIGNS:  The patient's temperature maximum is 101.2, blood pressure 166/91,   pulse ox on room air is 98%, pulse is 105, Accu-Cheks 181.  GENERAL:  She is a cachectic appearing elderly, very weak appearing female lying   in bed on her side.  She does arouse to stimulation.  However, she is   disoriented.  HEART:  Slightly tachycardic.    LUNGS:  Clear to auscultation bilaterally.  She has no respiratory distress.  ABDOMEN:  Soft.  The catheter has dark urine, but not as red as it had been.  EXTREMITIES:  No significant edema.    Labs, x-rays and medications are on the chart and reviewed.    ASSESSMENT:    1. UTI with SIRS.  2. Decreased mental status.  3. Hematuria secondary to UTI.  4. Elevated blood pressure.  5. End-stage dementia.  6. Multiple other medical problems including her temperature elevation.    PLAN:  Give Lopressor IV.  Place clonidine patch.  Recheck a.m. labs and start   TPN secondary to her malnutrition.  Follow up in the morning.        ______________________________  Adria Reyes  MD LOU/SUSAN  DD:  12/05/2023  Time:  08:17PM  DT:  12/05/2023  Time:  09:05PM  Job #:  [id number]      PROGRESS NOTE

## 2023-12-06 NOTE — PROGRESS NOTES
[facility]                     [address] [facility] [address]    PATIENT NAME:       MANDI YODER  YOB: 1945  CSN:                [id number]   MRN:                [id number]  ADMIT DATE:         12/01/2023 15:09:00  PHYSICIAN:          Adria Reyes MD                            PROGRESS NOTE    DATE:  12/05/2023 00:00:00    SUBJECTIVE:  She remains in the hospital secondary to UTI with SIRS.  She has   decreased mental status and unfortunately her culture was contaminated.  We send   another one yesterday from the Evans catheter specimen.  She is not taking a   medication.  She has elevated blood pressure and she is malnourished.    PHYSICAL EXAMINATION:  VITAL SIGNS:  Vitals on the chart and reviewed.  She is afebrile.  HEART:  Regular rate and rhythm.  LUNGS:  Clear to auscultation bilaterally.  ABDOMEN:  Soft, nontender.  EXTREMITIES:  No significant edema.  Evans catheter is in place and has dark   urine, but no gross hematuria.    ASSESSMENT:    1. Urinary tract infection with systemic inflammatory response syndrome.  2. Hematuria.  3. End-stage dementia with decreased mental status.  4. Debility.  5. Malnutrition.  6. Uncontrolled hypertension.  7. Elevated glucose.  8. Multiple other medical problems as per problem list.    PLAN:  Continue current care with IV Lopressor p.r.n. and we placed a clonidine   patch yesterday.  Continue TPN and we added gentamicin yesterday to her Zyvox.    Follow up the new culture of her urine and continue supportive care.  She is a   DNR.    ______________________________  Adria Reyes MD    PBS/AQS  DD:  12/05/2023  Time:  08:19PM  DT:  12/05/2023  Time:  09:53PM  Job #:  [id number]      PROGRESS NOTE

## 2023-12-06 NOTE — PROGRESS NOTES
Inpatient Nutrition Assessment    Admit Date: 12/1/2023   Total duration of encounter: 5 days   Patient Age: 78 y.o.    Nutrition Recommendation/Prescription     Recommend continue TPN @ 50 mL/hr as medically appropriate. Provides: 1056 kcal; 60 g AA;  240 g Dextrose; 1200 mL fluid.   Consider adding 20% Intralipids (250 mL; 50 g) every other day.   Monitor Glucose every 6 hours.   Daily BMP, Mg+ and Phos.   Daily weight.   Continue oral diet as medically appropriate.   Monitor intake, weight, and labs.       RD following and available as needed. Thank you.     Communication of Recommendations: reviewed with nurse, reviewed with pharmacy, and EMR    Nutrition Assessment     Malnutrition Assessment/Nutrition-Focused Physical Exam    Malnutrition in the context of acute illness or injury  Degree of Malnutrition: severe malnutrition  Energy Intake: </= 50% of estimated energy requirement for >/= 5 days Prior to admit.   Interpretation of Weight Loss: unable to obtain  Body Fat:moderate depletion  Area of Body Fat Loss: orbital region , upper arm region - triceps / biceps, and thoracic and lumbar region - ribs, lower back, midaxillary line  Muscle Mass Loss: moderate depletion  Area of Muscle Mass Loss: temple region - temporalis muscle, clavicle bone region - pectoralis major, deltoid, trapezius muscles, clavicle and acromion bone region - deltoid muscle, and scapular bone region - trapezius, supraspinus, infraspinus muscles  Fluid Accumulation: unable to obtain  Edema: does not meet criteria   Reduced  Strength: unable to obtain  A minimum of two characteristics is recommended for diagnosis of either severe or non-severe malnutrition.    Chart Review    Reason Seen: continuous nutrition monitoring On TPN.    Malnutrition Screening Tool Results   Have you recently lost weight without trying?: No  Have you been eating poorly because of a decreased appetite?: No   MST Score: 0   Diagnosis:  1. Urinary tract infection  "with systemic inflammatory response syndrome.  2. Hematuria.  3. End-stage dementia with decreased mental status.  4. Debility.  5. Malnutrition.  6. Uncontrolled hypertension.  7. Elevated glucose.  8. Multiple other medical problems as per problem list.    Relevant Medical History:   Alzheimer's Disease.  UTI.     Nutrition-Related Medications:       Calorie Containing IV Medications: Clinimix    Nutrition-Related Labs:  12/6: (H).   12/5: (H); BUN 21(H); K+ 2.7(L); Phos 2.1(L); Alb 2.5(L); PreAlb 9.1(L).    Nutrition Orders:   Diet heart healthy  Amino acid 5% - dextrose 20% (CLINIMIX-E) solution (1650 mOsm/L) (2L  Provides 100 gm AA, 400 gm CHO (1360 kcal from dextrose), Na 70, K 60, Mg 10, Ca 9, Acetate 160, Cl 78, Phos 30)      Appetite/Oral Intake: poor/0-25% of meals    Factors Affecting Nutritional Intake: impaired cognitive status/motor control and decreased appetite    Food/Catholic/Cultural Preferences: none reported    Food Allergies: none reported    Wound(s):       Last Bowel Movement: 12/04/23    Comments    12/6: Pt with End Stage Dementia, poor appetite and intake. Muscle and fat wasting noted. Currently receiving TPN: ClinimixE 5/20%@50mL/hr. Labs and meds reviewed. Discussed with Pharm D. Will continue to monitor during stay.     Anthropometrics    Height: 5' 4.02" (162.6 cm)    Last Weight: 52.5 kg (115 lb 11.9 oz) (12/05/23 0614) Weight Method: Bed Scale  BMI (Calculated): 19.9  BMI Classification: underweight (BMI less than 22 if >65 years of age)     Ideal Body Weight (IBW), Female: 120.1 lb     % Ideal Body Weight, Female (lb): 96.63 %                             Usual Weight Provided By: unable to obtain usual weight    Wt Readings from Last 5 Encounters:   12/05/23 52.5 kg (115 lb 11.9 oz)     Weight Change(s) Since Admission:   Wt Readings from Last 1 Encounters:   12/05/23 0614 52.5 kg (115 lb 11.9 oz)   12/01/23 1550 52.6 kg (115 lb 15.4 oz)   Admit Weight: 52.6 kg (115 " lb 15.4 oz) (23 1550), Weight Method: Bed Scale    Estimated Needs    Weight Used For Calorie Calculations: 52.5 kg (115 lb 11.9 oz)  Energy Calorie Requirements (kcal): 1312 to 1575 kcals/day (25 to 30 kcals/kg/CBW).  Energy Need Method: Kcal/kg  Weight Used For Protein Calculations: 52 kg (114 lb 10.2 oz)  Protein Requirements: 60 g/day (1.2 g/kg/CBW).  Fluid Requirements (mL): 1312 mL/day (1mL/kcal).  Temp (24hrs), Av.9 °F (37.2 °C), Min:98.1 °F (36.7 °C), Max:100.1 °F (37.8 °C)       Enteral Nutrition    Patient not receiving enteral nutrition at this time.    Parenteral Nutrition    Standard Formula: Clinimix E   Custom Formula: not applicable  Additives: none  Rate/Volume: 50 mL/hr.   Lipids: none  Total Nutrition Provided by Parenteral Nutrition:  Calories Provided  1056 kcal/d, 80% needs   Protein Provided  60 g/d, 100% needs   Dextrose Provided  240 g/d, GIR 3.17 mg CHO/kg/min   Fluid Provided  1200 ml/d, 92% needs       Evaluation of Received Nutrient Intake    Calories: not meeting estimated needs  Protein: meeting estimated needs    Patient Education    Not applicable.    Nutrition Diagnosis     PES: Malnutrition related to  Current Condition as evidenced by Muscle and fat wasting; </= 50% Estimated Energy Requirements >/= 1 week prior to admit. (new)    Interventions/Goals     Intervention(s): modified composition of parenteral nutrition, modified rate of parenteral nutrition, and collaboration with other providers    Goal: Meet greater than 75% of nutritional needs by follow-up. (new)    Monitoring & Evaluation     Dietitian will monitor energy intake, parenteral nutrition intake, weight, weight change, electrolyte/renal panel, glucose/endocrine profile, and gastrointestinal profile.    Nutrition Risk/Follow-Up: moderate (follow-up in 3-5 days)   Please consult if re-assessment needed sooner.

## 2023-12-06 NOTE — PLAN OF CARE
Problem: Adult Inpatient Plan of Care  Goal: Plan of Care Review  Outcome: Ongoing, Progressing  Goal: Patient-Specific Goal (Individualized)  Outcome: Ongoing, Progressing  Goal: Absence of Hospital-Acquired Illness or Injury  Outcome: Ongoing, Progressing  Goal: Optimal Comfort and Wellbeing  Outcome: Ongoing, Progressing  Goal: Readiness for Transition of Care  Outcome: Ongoing, Progressing     Problem: Fall Injury Risk  Goal: Absence of Fall and Fall-Related Injury  Outcome: Ongoing, Progressing     Problem: UTI (Urinary Tract Infection)  Goal: Improved Infection Symptoms  Outcome: Ongoing, Progressing     Problem: Pain Acute  Goal: Acceptable Pain Control and Functional Ability  Outcome: Ongoing, Progressing     Problem: Infection  Goal: Absence of Infection Signs and Symptoms  Outcome: Ongoing, Progressing  Intervention: Prevent or Manage Infection  Flowsheets (Taken 12/6/2023 2344)  Fever Reduction/Comfort Measures: fluid intake increased  Infection Management: aseptic technique maintained  Isolation Precautions: precautions maintained     Problem: Skin Injury Risk Increased  Goal: Skin Health and Integrity  Outcome: Ongoing, Progressing

## 2023-12-07 LAB
ALBUMIN SERPL-MCNC: 2 G/DL (ref 3.4–4.8)
ALBUMIN/GLOB SERPL: 0.4 RATIO (ref 1.1–2)
ALP SERPL-CCNC: 72 UNIT/L (ref 40–150)
ALT SERPL-CCNC: 35 UNIT/L (ref 0–55)
AST SERPL-CCNC: 46 UNIT/L (ref 5–34)
BASOPHILS # BLD AUTO: 0.03 X10(3)/MCL
BASOPHILS NFR BLD AUTO: 0.3 %
BILIRUB SERPL-MCNC: 0.6 MG/DL
BUN SERPL-MCNC: 27 MG/DL (ref 9.8–20.1)
CALCIUM SERPL-MCNC: 9.3 MG/DL (ref 8.4–10.2)
CHLORIDE SERPL-SCNC: 99 MMOL/L (ref 98–107)
CO2 SERPL-SCNC: 24 MMOL/L (ref 23–31)
CREAT SERPL-MCNC: 0.9 MG/DL (ref 0.55–1.02)
EOSINOPHIL # BLD AUTO: 0.17 X10(3)/MCL (ref 0–0.9)
EOSINOPHIL NFR BLD AUTO: 1.9 %
ERYTHROCYTE [DISTWIDTH] IN BLOOD BY AUTOMATED COUNT: 13.5 % (ref 11.5–17)
GFR SERPLBLD CREATININE-BSD FMLA CKD-EPI: >60 MLS/MIN/1.73/M2
GLOBULIN SER-MCNC: 4.9 GM/DL (ref 2.4–3.5)
GLUCOSE SERPL-MCNC: 323 MG/DL (ref 82–115)
HCT VFR BLD AUTO: 34.9 % (ref 37–47)
HGB BLD-MCNC: 11.2 G/DL (ref 12–16)
IMM GRANULOCYTES # BLD AUTO: 0.04 X10(3)/MCL (ref 0–0.04)
IMM GRANULOCYTES NFR BLD AUTO: 0.4 %
LYMPHOCYTES # BLD AUTO: 1.71 X10(3)/MCL (ref 0.6–4.6)
LYMPHOCYTES NFR BLD AUTO: 18.9 %
MAGNESIUM SERPL-MCNC: 1.9 MG/DL (ref 1.6–2.6)
MCH RBC QN AUTO: 29.5 PG (ref 27–31)
MCHC RBC AUTO-ENTMCNC: 32.1 G/DL (ref 33–36)
MCV RBC AUTO: 91.8 FL (ref 80–94)
MONOCYTES # BLD AUTO: 0.48 X10(3)/MCL (ref 0.1–1.3)
MONOCYTES NFR BLD AUTO: 5.3 %
NEUTROPHILS # BLD AUTO: 6.63 X10(3)/MCL (ref 2.1–9.2)
NEUTROPHILS NFR BLD AUTO: 73.2 %
PHOSPHATE SERPL-MCNC: 3.1 MG/DL (ref 2.3–4.7)
PLATELET # BLD AUTO: 170 X10(3)/MCL (ref 130–400)
PMV BLD AUTO: 10.1 FL (ref 7.4–10.4)
POCT GLUCOSE: 276 MG/DL (ref 70–110)
POCT GLUCOSE: 281 MG/DL (ref 70–110)
POCT GLUCOSE: 296 MG/DL (ref 70–110)
POTASSIUM SERPL-SCNC: 2.8 MMOL/L (ref 3.5–5.1)
PROT SERPL-MCNC: 6.9 GM/DL (ref 5.8–7.6)
RBC # BLD AUTO: 3.8 X10(6)/MCL (ref 4.2–5.4)
SODIUM SERPL-SCNC: 135 MMOL/L (ref 136–145)
WBC # SPEC AUTO: 9.06 X10(3)/MCL (ref 4.5–11.5)

## 2023-12-07 PROCEDURE — 80053 COMPREHEN METABOLIC PANEL: CPT | Performed by: FAMILY MEDICINE

## 2023-12-07 PROCEDURE — 11000001 HC ACUTE MED/SURG PRIVATE ROOM

## 2023-12-07 PROCEDURE — 84100 ASSAY OF PHOSPHORUS: CPT | Performed by: FAMILY MEDICINE

## 2023-12-07 PROCEDURE — 83735 ASSAY OF MAGNESIUM: CPT | Performed by: FAMILY MEDICINE

## 2023-12-07 PROCEDURE — 63600175 PHARM REV CODE 636 W HCPCS: Performed by: FAMILY MEDICINE

## 2023-12-07 PROCEDURE — A4216 STERILE WATER/SALINE, 10 ML: HCPCS | Performed by: FAMILY MEDICINE

## 2023-12-07 PROCEDURE — 85025 COMPLETE CBC W/AUTO DIFF WBC: CPT | Performed by: FAMILY MEDICINE

## 2023-12-07 PROCEDURE — 94761 N-INVAS EAR/PLS OXIMETRY MLT: CPT

## 2023-12-07 PROCEDURE — 25000003 PHARM REV CODE 250: Performed by: FAMILY MEDICINE

## 2023-12-07 RX ADMIN — LINEZOLID 600 MG: 600 INJECTION, SOLUTION INTRAVENOUS at 06:12

## 2023-12-07 RX ADMIN — METOPROLOL TARTRATE 5 MG: 1 INJECTION, SOLUTION INTRAVENOUS at 06:12

## 2023-12-07 RX ADMIN — INSULIN ASPART 8 UNITS: 100 INJECTION, SOLUTION INTRAVENOUS; SUBCUTANEOUS at 07:12

## 2023-12-07 RX ADMIN — SODIUM CHLORIDE, PRESERVATIVE FREE 10 ML: 5 INJECTION INTRAVENOUS at 06:12

## 2023-12-07 RX ADMIN — GENTAMICIN SULFATE 210 MG: 40 INJECTION, SOLUTION INTRAMUSCULAR; INTRAVENOUS at 09:12

## 2023-12-07 RX ADMIN — INSULIN ASPART 6 UNITS: 100 INJECTION, SOLUTION INTRAVENOUS; SUBCUTANEOUS at 04:12

## 2023-12-07 RX ADMIN — ENOXAPARIN SODIUM 30 MG: 40 INJECTION SUBCUTANEOUS at 04:12

## 2023-12-07 RX ADMIN — FAMOTIDINE 20 MG: 10 INJECTION, SOLUTION INTRAVENOUS at 09:12

## 2023-12-07 RX ADMIN — SODIUM CHLORIDE, PRESERVATIVE FREE 10 ML: 5 INJECTION INTRAVENOUS at 12:12

## 2023-12-07 RX ADMIN — INSULIN ASPART 6 UNITS: 100 INJECTION, SOLUTION INTRAVENOUS; SUBCUTANEOUS at 11:12

## 2023-12-07 RX ADMIN — LEUCINE, PHENYLALANINE, LYSINE, METHIONINE, ISOLEUCINE, VALINE, HISTIDINE, THREONINE, TRYPTOPHAN, ALANINE, GLYCINE, ARGININE, PROLINE, SERINE, TYROSINE, SODIUM ACETATE, DIBASIC POTASSIUM PHOSPHATE, MAGNESIUM CHLORIDE, SODIUM CHLORIDE, CALCIUM CHLORIDE, DEXTROSE
365; 280; 290; 200; 300; 290; 240; 210; 90; 1035; 515; 575; 340; 250; 20; 340; 261; 51; 59; 33; 20 INJECTION INTRAVENOUS at 01:12

## 2023-12-07 RX ADMIN — LINEZOLID 600 MG: 600 INJECTION, SOLUTION INTRAVENOUS at 11:12

## 2023-12-07 RX ADMIN — METOPROLOL TARTRATE 5 MG: 1 INJECTION, SOLUTION INTRAVENOUS at 01:12

## 2023-12-07 RX ADMIN — MONOBASIC POTASSIUM PHOSPHATE AND DIBASIC POTASSIUM PHOSPHATE 20 MMOL: 175; 300 INJECTION INTRAVENOUS at 02:12

## 2023-12-07 RX ADMIN — INSULIN ASPART 3 UNITS: 100 INJECTION, SOLUTION INTRAVENOUS; SUBCUTANEOUS at 09:12

## 2023-12-08 LAB
ALBUMIN SERPL-MCNC: 1.9 G/DL (ref 3.4–4.8)
ALBUMIN/GLOB SERPL: 0.4 RATIO (ref 1.1–2)
ALP SERPL-CCNC: 82 UNIT/L (ref 40–150)
ALT SERPL-CCNC: 62 UNIT/L (ref 0–55)
APPEARANCE UR: CLEAR
AST SERPL-CCNC: 96 UNIT/L (ref 5–34)
BACTERIA #/AREA URNS AUTO: ABNORMAL /HPF
BASOPHILS # BLD AUTO: 0.02 X10(3)/MCL
BASOPHILS NFR BLD AUTO: 0.3 %
BILIRUB SERPL-MCNC: 0.6 MG/DL
BILIRUB UR QL STRIP.AUTO: NEGATIVE
BUN SERPL-MCNC: 32 MG/DL (ref 9.8–20.1)
CALCIUM SERPL-MCNC: 8.8 MG/DL (ref 8.4–10.2)
CHLORIDE SERPL-SCNC: 99 MMOL/L (ref 98–107)
CO2 SERPL-SCNC: 24 MMOL/L (ref 23–31)
COLOR UR AUTO: YELLOW
CREAT SERPL-MCNC: 0.82 MG/DL (ref 0.55–1.02)
EOSINOPHIL # BLD AUTO: 0.24 X10(3)/MCL (ref 0–0.9)
EOSINOPHIL NFR BLD AUTO: 3.5 %
ERYTHROCYTE [DISTWIDTH] IN BLOOD BY AUTOMATED COUNT: 13.4 % (ref 11.5–17)
GFR SERPLBLD CREATININE-BSD FMLA CKD-EPI: >60 MLS/MIN/1.73/M2
GLOBULIN SER-MCNC: 4.8 GM/DL (ref 2.4–3.5)
GLUCOSE SERPL-MCNC: 361 MG/DL (ref 82–115)
GLUCOSE UR QL STRIP.AUTO: ABNORMAL
HCT VFR BLD AUTO: 35.4 % (ref 37–47)
HGB BLD-MCNC: 11.3 G/DL (ref 12–16)
IMM GRANULOCYTES # BLD AUTO: 0.03 X10(3)/MCL (ref 0–0.04)
IMM GRANULOCYTES NFR BLD AUTO: 0.4 %
KETONES UR QL STRIP.AUTO: NEGATIVE
LEUKOCYTE ESTERASE UR QL STRIP.AUTO: NEGATIVE
LYMPHOCYTES # BLD AUTO: 1.14 X10(3)/MCL (ref 0.6–4.6)
LYMPHOCYTES NFR BLD AUTO: 16.7 %
MAGNESIUM SERPL-MCNC: 1.9 MG/DL (ref 1.6–2.6)
MCH RBC QN AUTO: 29.7 PG (ref 27–31)
MCHC RBC AUTO-ENTMCNC: 31.9 G/DL (ref 33–36)
MCV RBC AUTO: 92.9 FL (ref 80–94)
MONOCYTES # BLD AUTO: 0.48 X10(3)/MCL (ref 0.1–1.3)
MONOCYTES NFR BLD AUTO: 7 %
NEUTROPHILS # BLD AUTO: 4.93 X10(3)/MCL (ref 2.1–9.2)
NEUTROPHILS NFR BLD AUTO: 72.1 %
NITRITE UR QL STRIP.AUTO: NEGATIVE
PH UR STRIP.AUTO: 6.5 [PH]
PHOSPHATE SERPL-MCNC: 3 MG/DL (ref 2.3–4.7)
PLATELET # BLD AUTO: 148 X10(3)/MCL (ref 130–400)
PMV BLD AUTO: 10.5 FL (ref 7.4–10.4)
POCT GLUCOSE: 204 MG/DL (ref 70–110)
POCT GLUCOSE: 270 MG/DL (ref 70–110)
POCT GLUCOSE: 310 MG/DL (ref 70–110)
POCT GLUCOSE: 324 MG/DL (ref 70–110)
POTASSIUM SERPL-SCNC: 3.3 MMOL/L (ref 3.5–5.1)
PROT SERPL-MCNC: 6.7 GM/DL (ref 5.8–7.6)
PROT UR QL STRIP.AUTO: ABNORMAL
RBC # BLD AUTO: 3.81 X10(6)/MCL (ref 4.2–5.4)
RBC #/AREA URNS AUTO: >=100 /HPF
RBC UR QL AUTO: ABNORMAL
SODIUM SERPL-SCNC: 132 MMOL/L (ref 136–145)
SP GR UR STRIP.AUTO: 1.01 (ref 1–1.03)
SQUAMOUS #/AREA URNS AUTO: ABNORMAL /HPF
UROBILINOGEN UR STRIP-ACNC: 0.2
WBC # SPEC AUTO: 6.84 X10(3)/MCL (ref 4.5–11.5)
WBC #/AREA URNS AUTO: ABNORMAL /HPF

## 2023-12-08 PROCEDURE — 94761 N-INVAS EAR/PLS OXIMETRY MLT: CPT

## 2023-12-08 PROCEDURE — A4216 STERILE WATER/SALINE, 10 ML: HCPCS | Performed by: FAMILY MEDICINE

## 2023-12-08 PROCEDURE — 84100 ASSAY OF PHOSPHORUS: CPT | Performed by: FAMILY MEDICINE

## 2023-12-08 PROCEDURE — 85025 COMPLETE CBC W/AUTO DIFF WBC: CPT | Performed by: FAMILY MEDICINE

## 2023-12-08 PROCEDURE — 11000001 HC ACUTE MED/SURG PRIVATE ROOM

## 2023-12-08 PROCEDURE — 63600175 PHARM REV CODE 636 W HCPCS: Performed by: FAMILY MEDICINE

## 2023-12-08 PROCEDURE — 83735 ASSAY OF MAGNESIUM: CPT | Performed by: FAMILY MEDICINE

## 2023-12-08 PROCEDURE — 80053 COMPREHEN METABOLIC PANEL: CPT | Performed by: FAMILY MEDICINE

## 2023-12-08 PROCEDURE — 25000003 PHARM REV CODE 250: Performed by: FAMILY MEDICINE

## 2023-12-08 PROCEDURE — 87086 URINE CULTURE/COLONY COUNT: CPT | Performed by: FAMILY MEDICINE

## 2023-12-08 PROCEDURE — 81001 URINALYSIS AUTO W/SCOPE: CPT | Performed by: FAMILY MEDICINE

## 2023-12-08 RX ADMIN — INSULIN ASPART 3 UNITS: 100 INJECTION, SOLUTION INTRAVENOUS; SUBCUTANEOUS at 09:12

## 2023-12-08 RX ADMIN — INSULIN ASPART 8 UNITS: 100 INJECTION, SOLUTION INTRAVENOUS; SUBCUTANEOUS at 06:12

## 2023-12-08 RX ADMIN — LEUCINE, PHENYLALANINE, LYSINE, METHIONINE, ISOLEUCINE, VALINE, HISTIDINE, THREONINE, TRYPTOPHAN, ALANINE, GLYCINE, ARGININE, PROLINE, SERINE, TYROSINE, SODIUM ACETATE, DIBASIC POTASSIUM PHOSPHATE, MAGNESIUM CHLORIDE, SODIUM CHLORIDE, CALCIUM CHLORIDE, DEXTROSE
365; 280; 290; 200; 300; 290; 240; 210; 90; 1035; 515; 575; 340; 250; 20; 340; 261; 51; 59; 33; 20 INJECTION INTRAVENOUS at 11:12

## 2023-12-08 RX ADMIN — FAMOTIDINE 20 MG: 10 INJECTION, SOLUTION INTRAVENOUS at 09:12

## 2023-12-08 RX ADMIN — LINEZOLID 600 MG: 600 INJECTION, SOLUTION INTRAVENOUS at 06:12

## 2023-12-08 RX ADMIN — METOPROLOL TARTRATE 5 MG: 1 INJECTION, SOLUTION INTRAVENOUS at 12:12

## 2023-12-08 RX ADMIN — SODIUM CHLORIDE, PRESERVATIVE FREE 10 ML: 5 INJECTION INTRAVENOUS at 12:12

## 2023-12-08 RX ADMIN — METOPROLOL TARTRATE 5 MG: 1 INJECTION, SOLUTION INTRAVENOUS at 05:12

## 2023-12-08 RX ADMIN — LINEZOLID 600 MG: 600 INJECTION, SOLUTION INTRAVENOUS at 05:12

## 2023-12-08 RX ADMIN — INSULIN ASPART 2 UNITS: 100 INJECTION, SOLUTION INTRAVENOUS; SUBCUTANEOUS at 12:12

## 2023-12-08 RX ADMIN — SODIUM CHLORIDE, PRESERVATIVE FREE 10 ML: 5 INJECTION INTRAVENOUS at 05:12

## 2023-12-08 RX ADMIN — INSULIN DETEMIR 10 UNITS: 100 INJECTION, SOLUTION SUBCUTANEOUS at 12:12

## 2023-12-08 RX ADMIN — ENOXAPARIN SODIUM 30 MG: 40 INJECTION SUBCUTANEOUS at 05:12

## 2023-12-08 RX ADMIN — INSULIN DETEMIR 10 UNITS: 100 INJECTION, SOLUTION SUBCUTANEOUS at 09:12

## 2023-12-08 RX ADMIN — INSULIN ASPART 8 UNITS: 100 INJECTION, SOLUTION INTRAVENOUS; SUBCUTANEOUS at 03:12

## 2023-12-08 RX ADMIN — FAMOTIDINE 20 MG: 10 INJECTION, SOLUTION INTRAVENOUS at 10:12

## 2023-12-08 RX ADMIN — GENTAMICIN SULFATE 210 MG: 40 INJECTION, SOLUTION INTRAMUSCULAR; INTRAVENOUS at 09:12

## 2023-12-08 NOTE — PROGRESS NOTES
[facility]                     [address] [facility] [address]    PATIENT NAME:       MANDI YODER  YOB: 1945  CSN:                [id number]   MRN:                [id number]  ADMIT DATE:         12/01/2023 15:09:00  PHYSICIAN:          Adria Reyes MD                            PROGRESS NOTE    DATE:  12/06/2023 14:07:18    SUBJECTIVE:  She remains in the hospital semicomatose from her UTI with SIRS.    Cultures pending.  We had her on gentamicin and Zyvox.  For malnutrition, we   started TPN.  Son is at bedside.    PHYSICAL EXAMINATION:  VITAL SIGNS:  Stable.  She is afebrile.  Pulse ox is stable on room air.  GENERAL:  She appears very comfortable and arouses to painful stimuli.  HEART:  Regular rate and rhythm.  LUNGS:  Clear to auscultation bilaterally.  ABDOMEN:  Soft, nontender.  EXTREMITIES:  No significant edema.    ASSESSMENT:    1. Urinary tract infection with systemic inflammatory response syndrome.  2. Multiple electrolyte abnormalities including low potassium.  3. Elevated glucose secondary to total parenteral nutrition.  4. Malnutrition.  5. Hematuria secondary to the urinary tract infection.  6. Diminished mental status with underlying end-stage dementia.  7. Multiple other medical problems as per problem list.    PLAN:  Recheck labs in the morning and continue current care.  Follow up the   culture when returns.        ______________________________  Adria Reyes MD    PBS/AQS  DD:  12/08/2023  Time:  12:52PM  DT:  12/08/2023  Time:  01:10PM  Job #:  [id number]      PROGRESS NOTE

## 2023-12-08 NOTE — PROGRESS NOTES
[facility]                     [address] [address]    PATIENT NAME:       MANDI YODER  YOB: 1945  CSN:                [id number]   MRN:                [id number]  ADMIT DATE:         12/01/2023 15:09:00  PHYSICIAN:          Adria Reyes MD                            PROGRESS NOTE    DATE:  12/07/2023 00:00:00    SUBJECTIVE:  She remains in the hospital secondary to multiple medical problems   including her UTI with SIRS and diminished mental status with malnutrition and   multiple electrolyte abnormalities.  She is receiving antibiotics and son is at   bedside.  He states that she did wake up this morning and was responding to him.    Otherwise, she has been in a comatose.    OBJECTIVE:  HEART:  Regular rate and rhythm.  LUNGS:  Clear to auscultation bilaterally.  ABDOMEN:  Nontender, nondistended.  Normoactive bowel sounds.  EXTREMITIES:  No significant edema.    ASSESSMENT:    1. Diminished mental status with underlying dementia secondary to her urinary   tract infection with systemic inflammatory response syndrome.  2. Hematuria secondary to urinary tract infection.  3. Malnutrition.  4. Elevated glucose secondary to TPN.  5. Low potassium.  6. Low albumin.  7. Mild anemia.  8. Multiple other medical problems as listed.    PLAN:  Replace K-Phos and recheck a.m. labs.  Follow up in the morning.        ______________________________  Adria Reyes MD    PBS/AQS  DD:  12/08/2023  Time:  12:54PM  DT:  12/08/2023  Time:  03:04PM  Job #:  [id number]      PROGRESS NOTE

## 2023-12-08 NOTE — PHYSICIAN QUERY
"PT Name: Lilliam Quintanilla  MR #: [id number]    DOCUMENTATION CLARIFICATION   Ladonna Pérez RN, CCDS    [email or web address]    Documentation Excellence  This form is a permanent document in the medical record.     Query Date: December 8, 2023    By submitting this query, we are merely seeking further clarification of documentation.    Please utilize your independent clinical judgment when addressing the question(s) below.    The medical record contains the following:   Indicators  Supporting Clinical Findings Location in Medical Record   x Registered Dietician Diagnosis Malnutrition Assessment/Nutrition-Focused Physical Exam  Malnutrition in the context of acute illness or injury    Degree of Malnutrition: severe malnutrition   RD 12/6   x Energy Intake Energy Intake: </= 50% of estimated energy requirement for >/= 5 days Prior to admit.      Appetite/Oral Intake: poor/0-25% of meals    RD 12/6   x Weight Loss Interpretation of Weight Loss: unable to obtain  RD 12/6   x Fat Loss Body Fat:moderate depletion  Area of Body Fat Loss: orbital region , upper arm region - triceps / biceps, and thoracic and lumbar region - ribs, lower back, midaxillary line RD 12/6   x Muscle Loss Muscle Mass Loss: moderate depletion  Area of Muscle Mass Loss: temple region - temporalis muscle, clavicle bone region - pectoralis major, deltoid, trapezius muscles, clavicle and acromion bone region - deltoid muscle, and scapular bone region - trapezius, supraspinus, infraspinus muscles RD 12/6   x Edema/Fluid Accumulation Fluid Accumulation: unable to obtain  Edema: does not meet criteria  RD 12/6   x Reduced  Strength (by dynamometer) Reduced  Strength: unable to obtain  RD 12/6   x Weight, BMI, Usual Body Weight Height: 5' 4.02" (162.6 cm)       Last Weight: 52.5 kg  (12/05/23 0614)     BMI (Calculated): 19.9  BMI Classification: underweight (BMI less than 22 if >65 years of age)  Ideal Body Weight (IBW), Female: 120.1 lb  % " Ideal Body Weight, Female (lb): 96.63 %  Usual Weight Provided By: unable to obtain usual weight RD 12/6    Delayed Wound Healing     x Acute or Chronic Illness Diagnosis:  1. Urinary tract infection with systemic inflammatory response syndrome.  2. Hematuria.  3. End-stage dementia with decreased mental status.  4. Debility.  5. Malnutrition.  6. Uncontrolled hypertension.  7. Elevated glucose.  8. Multiple other medical problems as per problem list.    Relevant Medical History:   Alzheimer's Disease.  UTI.  RD 12/6   x Social or Environmental Circumstances Factors Affecting Nutritional Intake: impaired cognitive status/motor control and decreased appetite  RD 12/6   x Treatment Recommend continue TPN @ 50 mL/hr as medically appropriate. Provides: 1056 kcal; 60 g AA;  240 g Dextrose; 1200 mL fluid.   Consider adding 20% Intralipids (250 mL; 50 g) every other day.   Monitor Glucose every 6 hours.   Daily BMP, Mg+ and Phos.   Daily weight.   Continue oral diet as medically appropriate.   Monitor intake, weight, and labs.     Malnutrition Screening Tool Results  Have you recently lost weight without trying?: No  Have you been eating poorly because of a decreased appetite?: No  MST Score: 0    Nutrition Orders:   Diet heart healthy  Amino acid 5% - dextrose 20% (CLINIMIX-E) solution (1650 mOsm/L) (2L  Provides 100 gm AA, 400 gm CHO (1360 kcal from dextrose), Na 70, K 60, Mg 10, Ca 9, Acetate 160, Cl 78, Phos 30)    End Stage Dementia, poor appetite and intake. Muscle and fat wasting noted. Currently receiving TPN: ClinimixE 5/20%@50mL/hr.     PES: Malnutrition related to  Current Condition as evidenced by Muscle and fat wasting; </= 50% Estimated Energy Requirements >/= 1 week prior to admit. (new)    RD 12/6   x Other Malnutrition  TPN secondary to her malnutrition  malnourished Hosp pn 12/3  Hosp pn 12/4  Hosp Pn 12/5     Academy of Nutrition and Dietetics (Academy) and the American Society for Parenteral and Enteral  "Nutrition (A.S.P.E.N.) Clinical Characteristics to support Malnutrition   Malnutrition in the Context of Acute Illness or Injury Malnutrition in the Context of Chronic Illness or Injury Malnutrition in the Context of Social or Environmental Circumstances   Malnutrition Level Moderate Severe Moderate Severe   Moderate   Severe   Energy Intake <75%   >7 days <50%                               >5 days <75%                     >1 month <75%                     >1 month <75% for >3 months <50% for >1 month   Weight Loss   1-2% in 1 week >2% in 1 week 5% in 1 month >5% in 1 month 5% in 1 month >5% in 1 month    5% in 1 month >5% in 1 month 7.5% in 3 months >7.5% in 3 months 7.5% in 3 months >7.5% in 3 months    7.5% in 3 months >7.5% in 3 months 10% in 6 months >10% in 6 months 10% in 6 months >10% in 6 months        20% in 1 year                    >20% in 1 year                                                                  20% in 1 year                            >20% in 1 year                                                  Subcutaneous Fat Loss Mild  Moderate  Mild  Severe    Mild   Severe   Muscle Loss Mild Moderate  Mild  Severe    Mild   Severe   Edema/Fluid Accumulation Mild Moderate to severe  Mild  Severe   Mild   Severe   Reduced  Strength         (based on standards supplied by  of dynamometer) N/A Measurably reduced N/A Measurably reduced N/A Measurably reduced       Criteria for mild malnutrition is defined as 1 characteristic outlined above within the established moderate or severe parameters.  A minimum of 2 out of the 6 characteristics noted above are recommended for a diagnosis of moderate or severe malnutrition.  Chronic illness/injury is a disease/condition lasting 3 months or longer.    The noted clinical guidelines are only system guidelines and do not replace the providers clinical judgment.    Provider, please specify  "malnutrition" degree -     [x  ] Severe Malnutrition - a " minimum of 2 of the 6 severe malnutrition characteristics noted above      [  ] Other degree - specify: ________________     [  ] Other Nutritional Diagnosis (please specify): _______       Please document in your progress notes daily for the duration of treatment until resolved and  include in your discharge summary.    Reference:  CHAD Sanders, PhD, RD, CASSI, RICKEY Tao, PhD, RN, KATHRINE Mortensen MD, PhD, Kennedy SALDIVAR A., MS, RD, [facility], PRESTON Huertas, MS, RD, The Academy Malnutrition Work Group, The A.S.P.E.N. Board of Directors. (2012). Consensus Statement: Academy of Nutrition and Dietetics and American Society for Parenteral and Enteral Nutrition: Characteristics Recommended for the Identification and Documentation of Adult Malnutrition (Undernutrition). Journal of Parenteral and Enteral Nutrition, 36(3), 275-283. doi:10.1177/3519419999902725   Form No. 74553

## 2023-12-08 NOTE — PHYSICIAN QUERY
PT Name: Lilliam Quintanilla  MR #: [id number]     DOCUMENTATION CLARIFICATION   Ladonna Pérez RN, CCDS    [email or web address]    Documentation Excellence  This form is a permanent document in the medical record.    Query Date: December 8, 2023    By submitting this query, we are merely seeking further clarification of documentation to reflect the severity of illness of your patient.   Please utilize your independent clinical judgment when addressing the question(s) below.    The Medical Record reflects the following:     Indicators  Supporting Clinical Findings Location in Medical Record   x Lab Value(s)   Date 12/01/23 17:23 12/03/23 03:01 12/05/23 04:15 12/06/23 08:49 12/07/23 03:46 12/08/23 04:46   Potassium 4.0 3.5 2.7 LL 2.4 LL 2.8 (L) 3.3 (L)    lab   x Treatment  /Medication potassium phosphate 20 mmol i IVPB 12/6, 7     mar    Other       Provider, please specify the diagnosis or diagnoses that correspond(s) to the above indicators.      [ x  ] Hypokalemia     [   ] Other electrolyte disturbance (please specify): __________     [   ]      Clinically Undetermined     Please document in your progress notes daily for the duration of treatment until resolved, and include in your discharge summary.

## 2023-12-09 LAB
ALBUMIN SERPL-MCNC: 2 G/DL (ref 3.4–4.8)
ALBUMIN/GLOB SERPL: 0.4 RATIO (ref 1.1–2)
ALP SERPL-CCNC: 92 UNIT/L (ref 40–150)
ALT SERPL-CCNC: 47 UNIT/L (ref 0–55)
AST SERPL-CCNC: 36 UNIT/L (ref 5–34)
BASOPHILS # BLD AUTO: 0.03 X10(3)/MCL
BASOPHILS NFR BLD AUTO: 0.3 %
BILIRUB SERPL-MCNC: 0.5 MG/DL
BUN SERPL-MCNC: 31 MG/DL (ref 9.8–20.1)
CALCIUM SERPL-MCNC: 9.3 MG/DL (ref 8.4–10.2)
CHLORIDE SERPL-SCNC: 99 MMOL/L (ref 98–107)
CO2 SERPL-SCNC: 24 MMOL/L (ref 23–31)
CREAT SERPL-MCNC: 0.82 MG/DL (ref 0.55–1.02)
EOSINOPHIL # BLD AUTO: 0.19 X10(3)/MCL (ref 0–0.9)
EOSINOPHIL NFR BLD AUTO: 2.1 %
ERYTHROCYTE [DISTWIDTH] IN BLOOD BY AUTOMATED COUNT: 13.2 % (ref 11.5–17)
GENTAMICIN TROUGH SERPL-MCNC: 0.7 UG/ML (ref 0–2)
GFR SERPLBLD CREATININE-BSD FMLA CKD-EPI: >60 MLS/MIN/1.73/M2
GLOBULIN SER-MCNC: 4.8 GM/DL (ref 2.4–3.5)
GLUCOSE SERPL-MCNC: 274 MG/DL (ref 82–115)
HCT VFR BLD AUTO: 32.4 % (ref 37–47)
HGB BLD-MCNC: 10.5 G/DL (ref 12–16)
IMM GRANULOCYTES # BLD AUTO: 0.05 X10(3)/MCL (ref 0–0.04)
IMM GRANULOCYTES NFR BLD AUTO: 0.6 %
LYMPHOCYTES # BLD AUTO: 1.36 X10(3)/MCL (ref 0.6–4.6)
LYMPHOCYTES NFR BLD AUTO: 15.3 %
MAGNESIUM SERPL-MCNC: 1.97 MG/DL (ref 1.6–2.6)
MCH RBC QN AUTO: 29.6 PG (ref 27–31)
MCHC RBC AUTO-ENTMCNC: 32.4 G/DL (ref 33–36)
MCV RBC AUTO: 91.3 FL (ref 80–94)
MONOCYTES # BLD AUTO: 0.79 X10(3)/MCL (ref 0.1–1.3)
MONOCYTES NFR BLD AUTO: 8.9 %
NEUTROPHILS # BLD AUTO: 6.48 X10(3)/MCL (ref 2.1–9.2)
NEUTROPHILS NFR BLD AUTO: 72.8 %
PHOSPHATE SERPL-MCNC: 2.9 MG/DL (ref 2.3–4.7)
PLATELET # BLD AUTO: 191 X10(3)/MCL (ref 130–400)
PMV BLD AUTO: 10 FL (ref 7.4–10.4)
POCT GLUCOSE: 232 MG/DL (ref 70–110)
POCT GLUCOSE: 279 MG/DL (ref 70–110)
POCT GLUCOSE: 292 MG/DL (ref 70–110)
POCT GLUCOSE: 297 MG/DL (ref 70–110)
POTASSIUM SERPL-SCNC: 3.3 MMOL/L (ref 3.5–5.1)
PROT SERPL-MCNC: 6.8 GM/DL (ref 5.8–7.6)
RBC # BLD AUTO: 3.55 X10(6)/MCL (ref 4.2–5.4)
SODIUM SERPL-SCNC: 133 MMOL/L (ref 136–145)
WBC # SPEC AUTO: 8.9 X10(3)/MCL (ref 4.5–11.5)

## 2023-12-09 PROCEDURE — 85025 COMPLETE CBC W/AUTO DIFF WBC: CPT | Performed by: FAMILY MEDICINE

## 2023-12-09 PROCEDURE — 11000001 HC ACUTE MED/SURG PRIVATE ROOM

## 2023-12-09 PROCEDURE — 63600175 PHARM REV CODE 636 W HCPCS: Performed by: FAMILY MEDICINE

## 2023-12-09 PROCEDURE — 25000003 PHARM REV CODE 250: Performed by: FAMILY MEDICINE

## 2023-12-09 PROCEDURE — 83735 ASSAY OF MAGNESIUM: CPT | Performed by: FAMILY MEDICINE

## 2023-12-09 PROCEDURE — A4216 STERILE WATER/SALINE, 10 ML: HCPCS | Performed by: FAMILY MEDICINE

## 2023-12-09 PROCEDURE — 94761 N-INVAS EAR/PLS OXIMETRY MLT: CPT

## 2023-12-09 PROCEDURE — 84100 ASSAY OF PHOSPHORUS: CPT | Performed by: FAMILY MEDICINE

## 2023-12-09 PROCEDURE — 80053 COMPREHEN METABOLIC PANEL: CPT | Performed by: FAMILY MEDICINE

## 2023-12-09 PROCEDURE — 80170 ASSAY OF GENTAMICIN: CPT | Performed by: FAMILY MEDICINE

## 2023-12-09 RX ORDER — INSULIN ASPART 100 [IU]/ML
0-15 INJECTION, SOLUTION INTRAVENOUS; SUBCUTANEOUS EVERY 6 HOURS PRN
Status: DISCONTINUED | OUTPATIENT
Start: 2023-12-09 | End: 2023-12-10

## 2023-12-09 RX ORDER — ENOXAPARIN SODIUM 100 MG/ML
30 INJECTION SUBCUTANEOUS EVERY 24 HOURS
Status: DISCONTINUED | OUTPATIENT
Start: 2023-12-09 | End: 2023-12-13 | Stop reason: HOSPADM

## 2023-12-09 RX ORDER — GLUCAGON 1 MG
1 KIT INJECTION
Status: DISCONTINUED | OUTPATIENT
Start: 2023-12-09 | End: 2023-12-13 | Stop reason: HOSPADM

## 2023-12-09 RX ADMIN — INSULIN DETEMIR 15 UNITS: 100 INJECTION, SOLUTION SUBCUTANEOUS at 11:12

## 2023-12-09 RX ADMIN — METOPROLOL TARTRATE 5 MG: 1 INJECTION, SOLUTION INTRAVENOUS at 12:12

## 2023-12-09 RX ADMIN — METOPROLOL TARTRATE 5 MG: 1 INJECTION, SOLUTION INTRAVENOUS at 06:12

## 2023-12-09 RX ADMIN — INSULIN ASPART 6 UNITS: 100 INJECTION, SOLUTION INTRAVENOUS; SUBCUTANEOUS at 11:12

## 2023-12-09 RX ADMIN — SODIUM CHLORIDE, PRESERVATIVE FREE 10 ML: 5 INJECTION INTRAVENOUS at 02:12

## 2023-12-09 RX ADMIN — METOPROLOL TARTRATE 5 MG: 1 INJECTION, SOLUTION INTRAVENOUS at 02:12

## 2023-12-09 RX ADMIN — SODIUM CHLORIDE, PRESERVATIVE FREE 10 ML: 5 INJECTION INTRAVENOUS at 06:12

## 2023-12-09 RX ADMIN — GENTAMICIN SULFATE 210 MG: 40 INJECTION, SOLUTION INTRAMUSCULAR; INTRAVENOUS at 10:12

## 2023-12-09 RX ADMIN — INSULIN DETEMIR 10 UNITS: 100 INJECTION, SOLUTION SUBCUTANEOUS at 09:12

## 2023-12-09 RX ADMIN — INSULIN ASPART 9 UNITS: 100 INJECTION, SOLUTION INTRAVENOUS; SUBCUTANEOUS at 02:12

## 2023-12-09 RX ADMIN — LEUCINE, PHENYLALANINE, LYSINE, METHIONINE, ISOLEUCINE, VALINE, HISTIDINE, THREONINE, TRYPTOPHAN, ALANINE, GLYCINE, ARGININE, PROLINE, SERINE, TYROSINE, SODIUM ACETATE, DIBASIC POTASSIUM PHOSPHATE, MAGNESIUM CHLORIDE, SODIUM CHLORIDE, CALCIUM CHLORIDE, DEXTROSE
365; 280; 290; 200; 300; 290; 240; 210; 90; 1035; 515; 575; 340; 250; 20; 340; 261; 51; 59; 33; 20 INJECTION INTRAVENOUS at 09:12

## 2023-12-09 RX ADMIN — FAMOTIDINE 20 MG: 10 INJECTION, SOLUTION INTRAVENOUS at 10:12

## 2023-12-09 RX ADMIN — LINEZOLID 600 MG: 600 INJECTION, SOLUTION INTRAVENOUS at 06:12

## 2023-12-09 RX ADMIN — ENOXAPARIN SODIUM 30 MG: 30 INJECTION SUBCUTANEOUS at 06:12

## 2023-12-09 RX ADMIN — INSULIN ASPART 6 UNITS: 100 INJECTION, SOLUTION INTRAVENOUS; SUBCUTANEOUS at 06:12

## 2023-12-09 RX ADMIN — FAMOTIDINE 20 MG: 10 INJECTION, SOLUTION INTRAVENOUS at 09:12

## 2023-12-09 RX ADMIN — SODIUM CHLORIDE, PRESERVATIVE FREE 10 ML: 5 INJECTION INTRAVENOUS at 12:12

## 2023-12-09 NOTE — PHYSICIAN QUERY
PT Name: Lilliam Quintanilla                                                             -withdrawn  [facility]  MR #: [id number]    DOCUMENTATION CLARIFICATION   Ladonna Pérez RN, CCDS    [email or web address]    Documentation Excellence  This form is a permanent document in the medical record.     Query Date: December 9, 2023    By submitting this query, we are merely seeking further clarification of documentation. Please utilize your independent clinical judgment when addressing the question(s) below.    The Medical Record contains the following:   Indicators  Supporting Clinical Findings Location in Medical Record   x AMS, Confusion,  LOC, etc.   Decreased mental status with underlying dementia.     Decline in mental status & blood in urine 2 weeks     Hallucinating     very confused.       End-stage dementia   Severe debility.  most likely once we get on top of this  infection of her urine, she will begin to respond more again.    Diminished mental status with underlying dementia 2/2 her UTI with systemic inflammatory response syndrome.   H&P    H&P    12/3 Hosp PN          12/8 Hosp   x Acute/  Chronic Illness Malnutrition.  Admitted  directly for hematuria 2/2  UTI, failing outpatient treatment.  Low potassium.  Severe debility.    Slightly tachycardic.  UTI with SIRS    Severe malnutrition    PMHX:   anxiety and depression.  She has history of some  sort of female cancer.    She has cervical disk disorder, C4-5, C5-6, C6-7 with radiculopathy.    She has contact dermatitis, history of pulmonary artery disease, depression, diabetes type 2, hyperlipidemia, hypertension, hypothyroidism, idiopathic chronic pancreatitis, moderate pulmonic valve insufficiency and seasonal allergic rhinitis.    She has a thyroid disorder, trace mitral regurgitation and tricuspid valve disorder as well.    She has history of pneumocephalus, traumatic with a fracture of her skull in the past due to a   fall, dementia.   -12/3 Hosp  PN          12/4 Hosp PN    12/9 MD query response        H&P   x Radiology Findings Xray yest:  nondisplaced metatarsal fracture of the 3rd Metatarsal    1. No acute intracranial abnormality identified  2. Generalized cerebral and cerebellar atrophy  3. Intracranial atherosclerosis  4. Scattered hypodensities at the periventricular white matter suspicious for small vessel   12/3 Hosp PN      12/6 CT Scan   x Electrolyte Imbalance hypoKalemia MD query response  12/9    Medication     x Treatment         directly for hematuria 2/2  UTI, failing outpatient treatment.    remains in the hospital secondary to UTI with SIRS  elevated blood pressure and she is malnourished. 12/3 Hosp PN     -12/5 Hosp       Other       The noted clinical guidelines are only system guidelines and do not replace the providers clinical judgment.    The National [place] of Neurologic Disorders and Stroke (NINDS) of the NIH describes encephalopathy as any diffuse disease of the brain that alters brain function or structure.    Provider,   - part question -     #1 of 2:  please specify the diagnosis or diagnoses associated with above clinical findings.    [   ] Metabolic Encephalopathy - Due to electrolyte imbalance, metabolic derangements, or infectious processes, includes Septic Encephalopathy, Uremic Encephalopathy     [   ] Diminished mental status with underlying dementia 2/2 her UTI with systemic inflammatory response syndrome.     [   ] Other Encephalopathy (please specify): ______________      [   ] Other - specify: __________     [   ]    Clinically Undetermined     #2 of 2:  Present on Admission (POA) Status:    [   ] Yes (Y)     [   ] No (N)     [   ] Clinically Undetermined (W)     [   ]   Documentation insufficient to determine if condition is POA (U)     Please document in your progress notes daily for the duration of treatment until resolved, and include in your discharge summary.    References:  MARTÍN Ribera, RN, CCDS. (2018,  June 9). Notes from the Instructor: Encephalopathy tips. Retrieved October 22, 2020, from [email or web address]-tips  ICD-9-CM Coding Clinic First Quarter 2013, Effective with discharges: October 21, 2013 [facility] § Seizure with encephalopathy due to postictal state (2013).  ICD-10-CM/arcbazar.com Integrated Codebook (Version V.20.8.10.0) [Computer software]. (2020). Retrieved October 21, 2020.  National [place] of Neurological Disorders and Stroke. (2019, March 27). Retrieved October 22, 2020, from https://www.ninds.nih.gov/Disorders/All-Disorders/Kdjwydfsbjdxgb-Muhqbpkadlm-Pppw  Form No. 80271

## 2023-12-10 LAB
ALBUMIN SERPL-MCNC: 2 G/DL (ref 3.4–4.8)
ALBUMIN/GLOB SERPL: 0.4 RATIO (ref 1.1–2)
ALP SERPL-CCNC: 90 UNIT/L (ref 40–150)
ALT SERPL-CCNC: 41 UNIT/L (ref 0–55)
AST SERPL-CCNC: 30 UNIT/L (ref 5–34)
BACTERIA BLD CULT: NORMAL
BACTERIA BLD CULT: NORMAL
BACTERIA UR CULT: NO GROWTH
BASOPHILS # BLD AUTO: 0.02 X10(3)/MCL
BASOPHILS NFR BLD AUTO: 0.2 %
BILIRUB SERPL-MCNC: 0.5 MG/DL
BUN SERPL-MCNC: 32 MG/DL (ref 9.8–20.1)
CALCIUM SERPL-MCNC: 9.4 MG/DL (ref 8.4–10.2)
CHLORIDE SERPL-SCNC: 100 MMOL/L (ref 98–107)
CO2 SERPL-SCNC: 24 MMOL/L (ref 23–31)
CREAT SERPL-MCNC: 0.84 MG/DL (ref 0.55–1.02)
EOSINOPHIL # BLD AUTO: 0.18 X10(3)/MCL (ref 0–0.9)
EOSINOPHIL NFR BLD AUTO: 2.1 %
ERYTHROCYTE [DISTWIDTH] IN BLOOD BY AUTOMATED COUNT: 13.2 % (ref 11.5–17)
GFR SERPLBLD CREATININE-BSD FMLA CKD-EPI: >60 MLS/MIN/1.73/M2
GLOBULIN SER-MCNC: 4.8 GM/DL (ref 2.4–3.5)
GLUCOSE SERPL-MCNC: 317 MG/DL (ref 82–115)
HCT VFR BLD AUTO: 32.7 % (ref 37–47)
HGB BLD-MCNC: 10.4 G/DL (ref 12–16)
IMM GRANULOCYTES # BLD AUTO: 0.03 X10(3)/MCL (ref 0–0.04)
IMM GRANULOCYTES NFR BLD AUTO: 0.4 %
LACTATE SERPL-SCNC: 1.6 MMOL/L (ref 0.5–2.2)
LYMPHOCYTES # BLD AUTO: 1.46 X10(3)/MCL (ref 0.6–4.6)
LYMPHOCYTES NFR BLD AUTO: 17.4 %
MAGNESIUM SERPL-MCNC: 2 MG/DL (ref 1.6–2.6)
MCH RBC QN AUTO: 29.9 PG (ref 27–31)
MCHC RBC AUTO-ENTMCNC: 31.8 G/DL (ref 33–36)
MCV RBC AUTO: 94 FL (ref 80–94)
MONOCYTES # BLD AUTO: 0.72 X10(3)/MCL (ref 0.1–1.3)
MONOCYTES NFR BLD AUTO: 8.6 %
NEUTROPHILS # BLD AUTO: 5.97 X10(3)/MCL (ref 2.1–9.2)
NEUTROPHILS NFR BLD AUTO: 71.3 %
PHOSPHATE SERPL-MCNC: 3 MG/DL (ref 2.3–4.7)
PLATELET # BLD AUTO: 191 X10(3)/MCL (ref 130–400)
PMV BLD AUTO: 10 FL (ref 7.4–10.4)
POCT GLUCOSE: 253 MG/DL (ref 70–110)
POCT GLUCOSE: 310 MG/DL (ref 70–110)
POCT GLUCOSE: 87 MG/DL (ref 70–110)
POCT GLUCOSE: 99 MG/DL (ref 70–110)
POTASSIUM SERPL-SCNC: 3.4 MMOL/L (ref 3.5–5.1)
PROT SERPL-MCNC: 6.8 GM/DL (ref 5.8–7.6)
RBC # BLD AUTO: 3.48 X10(6)/MCL (ref 4.2–5.4)
SODIUM SERPL-SCNC: 133 MMOL/L (ref 136–145)
WBC # SPEC AUTO: 8.38 X10(3)/MCL (ref 4.5–11.5)

## 2023-12-10 PROCEDURE — 84100 ASSAY OF PHOSPHORUS: CPT | Performed by: FAMILY MEDICINE

## 2023-12-10 PROCEDURE — 11000001 HC ACUTE MED/SURG PRIVATE ROOM

## 2023-12-10 PROCEDURE — 83605 ASSAY OF LACTIC ACID: CPT | Performed by: FAMILY MEDICINE

## 2023-12-10 PROCEDURE — A4216 STERILE WATER/SALINE, 10 ML: HCPCS | Performed by: FAMILY MEDICINE

## 2023-12-10 PROCEDURE — 25500020 PHARM REV CODE 255: Performed by: FAMILY MEDICINE

## 2023-12-10 PROCEDURE — 63600175 PHARM REV CODE 636 W HCPCS: Performed by: FAMILY MEDICINE

## 2023-12-10 PROCEDURE — 85025 COMPLETE CBC W/AUTO DIFF WBC: CPT | Performed by: FAMILY MEDICINE

## 2023-12-10 PROCEDURE — 83735 ASSAY OF MAGNESIUM: CPT | Performed by: FAMILY MEDICINE

## 2023-12-10 PROCEDURE — 94761 N-INVAS EAR/PLS OXIMETRY MLT: CPT

## 2023-12-10 PROCEDURE — 25000003 PHARM REV CODE 250: Performed by: FAMILY MEDICINE

## 2023-12-10 PROCEDURE — 80053 COMPREHEN METABOLIC PANEL: CPT | Performed by: FAMILY MEDICINE

## 2023-12-10 RX ORDER — CLONIDINE 0.2 MG/24H
2 PATCH, EXTENDED RELEASE TRANSDERMAL
Status: DISCONTINUED | OUTPATIENT
Start: 2023-12-10 | End: 2023-12-13 | Stop reason: HOSPADM

## 2023-12-10 RX ORDER — INSULIN ASPART 100 [IU]/ML
0-10 INJECTION, SOLUTION INTRAVENOUS; SUBCUTANEOUS EVERY 6 HOURS PRN
Status: DISCONTINUED | OUTPATIENT
Start: 2023-12-10 | End: 2023-12-13 | Stop reason: HOSPADM

## 2023-12-10 RX ADMIN — SODIUM CHLORIDE, PRESERVATIVE FREE 10 ML: 5 INJECTION INTRAVENOUS at 05:12

## 2023-12-10 RX ADMIN — INSULIN DETEMIR 15 UNITS: 100 INJECTION, SOLUTION SUBCUTANEOUS at 09:12

## 2023-12-10 RX ADMIN — METOPROLOL TARTRATE 5 MG: 1 INJECTION, SOLUTION INTRAVENOUS at 05:12

## 2023-12-10 RX ADMIN — DIATRIZOATE MEGLUMINE AND DIATRIZOATE SODIUM 30 ML: 660; 100 LIQUID ORAL; RECTAL at 06:12

## 2023-12-10 RX ADMIN — INSULIN ASPART 12 UNITS: 100 INJECTION, SOLUTION INTRAVENOUS; SUBCUTANEOUS at 05:12

## 2023-12-10 RX ADMIN — METOPROLOL TARTRATE 5 MG: 1 INJECTION, SOLUTION INTRAVENOUS at 01:12

## 2023-12-10 RX ADMIN — ENOXAPARIN SODIUM 30 MG: 30 INJECTION SUBCUTANEOUS at 05:12

## 2023-12-10 RX ADMIN — FAMOTIDINE 20 MG: 10 INJECTION, SOLUTION INTRAVENOUS at 09:12

## 2023-12-10 RX ADMIN — CLONIDINE 2 PATCH: 0.2 PATCH TRANSDERMAL at 01:12

## 2023-12-10 RX ADMIN — IOPAMIDOL 100 ML: 755 INJECTION, SOLUTION INTRAVENOUS at 05:12

## 2023-12-10 RX ADMIN — LEUCINE, PHENYLALANINE, LYSINE, METHIONINE, ISOLEUCINE, VALINE, HISTIDINE, THREONINE, TRYPTOPHAN, ALANINE, GLYCINE, ARGININE, PROLINE, SERINE, TYROSINE, SODIUM ACETATE, DIBASIC POTASSIUM PHOSPHATE, MAGNESIUM CHLORIDE, SODIUM CHLORIDE, CALCIUM CHLORIDE, DEXTROSE
365; 280; 290; 200; 300; 290; 240; 210; 90; 1035; 515; 575; 340; 250; 20; 340; 261; 51; 59; 33; 20 INJECTION INTRAVENOUS at 09:12

## 2023-12-10 RX ADMIN — SODIUM CHLORIDE, PRESERVATIVE FREE 10 ML: 5 INJECTION INTRAVENOUS at 01:12

## 2023-12-10 RX ADMIN — GENTAMICIN SULFATE 210 MG: 40 INJECTION, SOLUTION INTRAMUSCULAR; INTRAVENOUS at 10:12

## 2023-12-10 RX ADMIN — METOPROLOL TARTRATE 5 MG: 1 INJECTION, SOLUTION INTRAVENOUS at 12:12

## 2023-12-10 RX ADMIN — INSULIN ASPART 6 UNITS: 100 INJECTION, SOLUTION INTRAVENOUS; SUBCUTANEOUS at 12:12

## 2023-12-10 NOTE — PROGRESS NOTES
[facility]                     [address] [address]    PATIENT NAME:       MANDI YODER  YOB: 1945  CSN:                [id number]   MRN:                [id number]  ADMIT DATE:         12/01/2023 15:09:00  PHYSICIAN:          Adria Reyes MD                            PROGRESS NOTE    DATE:      SUBJECTIVE:  She remains in the hospital semi-comatose.  She did open her eyes   this morning and look at her son.  She continues to receive gentamicin for her   UTI.  We stopped her Zyvox yesterday.  Her urine culture has now returned with   no growth for 24 hours, we are awaiting the final.  She is hypertensive.  She is   being fed by TPN and she is having very elevated blood sugars despite moderate   dose sliding scale insulin and Levemir twice a day 15 units.    PHYSICAL EXAMINATION:  GENERAL:  She is lying in bed and does not respond to verbal or painful stimuli   currently.  She does not appear to be in distress however.  VITAL SIGNS:  On the chart and reviewed.  Pulse ox is 95% on room air.  LUNGS:  Appear to be clear.  HEART:  Regular rate and rhythm.  ABDOMEN:  Soft.  EXTREMITIES:  No significant edema.  Her urine in the Evans catheter bag is   clear and yellow now.    ASSESSMENT:    1. End-stage dementia with decreased mental status secondary to her infection   and also progression of her dementia.  2. Elevated glucose secondary to TPN.  3. Hypertension, unable to take her routine p.o. medications.  4. Anemia.  5. Low potassium.  6. Low sodium.  7. Low albumin.  8. Slightly prerenal.  9. Hematuria with UTI with SIRS-improving.    PLAN:  Increase clonidine to 0.4 mg patch change every 7 days.  Recheck a.m.   labs and get a CT of her abdomen and pelvis.  We will hope to place an NG tube   to be able to give her oral contrast and also we can feed her orally and   discontinue her  TPN.        ______________________________  MD DASHA Khanna/SUSAN  DD:  12/10/2023  Time:  03:33PM  DT:  12/10/2023  Time:  04:49PM  Job #:  [id number]      PROGRESS NOTE

## 2023-12-10 NOTE — PLAN OF CARE
Problem: Adult Inpatient Plan of Care  Goal: Plan of Care Review  Outcome: Ongoing, Progressing  Goal: Patient-Specific Goal (Individualized)  Outcome: Ongoing, Progressing  Goal: Absence of Hospital-Acquired Illness or Injury  Outcome: Ongoing, Progressing  Goal: Optimal Comfort and Wellbeing  Outcome: Ongoing, Progressing  Goal: Readiness for Transition of Care  Outcome: Ongoing, Progressing     Problem: Fall Injury Risk  Goal: Absence of Fall and Fall-Related Injury  Outcome: Ongoing, Progressing     Problem: UTI (Urinary Tract Infection)  Goal: Improved Infection Symptoms  Outcome: Ongoing, Progressing     Problem: Pain Acute  Goal: Acceptable Pain Control and Functional Ability  Outcome: Ongoing, Progressing     Problem: Infection  Goal: Absence of Infection Signs and Symptoms  Outcome: Ongoing, Progressing

## 2023-12-11 LAB
ALBUMIN SERPL-MCNC: 2 G/DL (ref 3.4–4.8)
ALBUMIN/GLOB SERPL: 0.4 RATIO (ref 1.1–2)
ALP SERPL-CCNC: 104 UNIT/L (ref 40–150)
ALT SERPL-CCNC: 46 UNIT/L (ref 0–55)
AMMONIA PLAS-MSCNC: 24.6 UMOL/L (ref 18–72)
AST SERPL-CCNC: 55 UNIT/L (ref 5–34)
BASOPHILS # BLD AUTO: 0.03 X10(3)/MCL
BASOPHILS NFR BLD AUTO: 0.3 %
BILIRUB SERPL-MCNC: 0.4 MG/DL
BUN SERPL-MCNC: 29 MG/DL (ref 9.8–20.1)
CALCIUM SERPL-MCNC: 9.3 MG/DL (ref 8.4–10.2)
CHLORIDE SERPL-SCNC: 100 MMOL/L (ref 98–107)
CK SERPL-CCNC: 24 U/L (ref 29–168)
CO2 SERPL-SCNC: 27 MMOL/L (ref 23–31)
CREAT SERPL-MCNC: 0.7 MG/DL (ref 0.55–1.02)
CRP SERPL-MCNC: 116.1 MG/L
EOSINOPHIL # BLD AUTO: 0.11 X10(3)/MCL (ref 0–0.9)
EOSINOPHIL NFR BLD AUTO: 1.3 %
ERYTHROCYTE [DISTWIDTH] IN BLOOD BY AUTOMATED COUNT: 13.3 % (ref 11.5–17)
ERYTHROCYTE [SEDIMENTATION RATE] IN BLOOD: 95 MM/HR (ref 0–20)
GFR SERPLBLD CREATININE-BSD FMLA CKD-EPI: >60 MLS/MIN/1.73/M2
GLOBULIN SER-MCNC: 5 GM/DL (ref 2.4–3.5)
GLUCOSE SERPL-MCNC: 129 MG/DL (ref 82–115)
HCT VFR BLD AUTO: 34.7 % (ref 37–47)
HGB BLD-MCNC: 11.1 G/DL (ref 12–16)
IMM GRANULOCYTES # BLD AUTO: 0.04 X10(3)/MCL (ref 0–0.04)
IMM GRANULOCYTES NFR BLD AUTO: 0.5 %
LYMPHOCYTES # BLD AUTO: 1.67 X10(3)/MCL (ref 0.6–4.6)
LYMPHOCYTES NFR BLD AUTO: 19.1 %
MAGNESIUM SERPL-MCNC: 1.8 MG/DL (ref 1.6–2.6)
MCH RBC QN AUTO: 29.8 PG (ref 27–31)
MCHC RBC AUTO-ENTMCNC: 32 G/DL (ref 33–36)
MCV RBC AUTO: 93 FL (ref 80–94)
MONOCYTES # BLD AUTO: 0.87 X10(3)/MCL (ref 0.1–1.3)
MONOCYTES NFR BLD AUTO: 9.9 %
NEUTROPHILS # BLD AUTO: 6.04 X10(3)/MCL (ref 2.1–9.2)
NEUTROPHILS NFR BLD AUTO: 68.9 %
PHOSPHATE SERPL-MCNC: 3.6 MG/DL (ref 2.3–4.7)
PLATELET # BLD AUTO: 224 X10(3)/MCL (ref 130–400)
PMV BLD AUTO: 10.3 FL (ref 7.4–10.4)
POCT GLUCOSE: 128 MG/DL (ref 70–110)
POCT GLUCOSE: 172 MG/DL (ref 70–110)
POCT GLUCOSE: 200 MG/DL (ref 70–110)
POTASSIUM SERPL-SCNC: 3.7 MMOL/L (ref 3.5–5.1)
PREALB SERPL-MCNC: 14.2 MG/DL (ref 14–37)
PROT SERPL-MCNC: 7 GM/DL (ref 5.8–7.6)
RBC # BLD AUTO: 3.73 X10(6)/MCL (ref 4.2–5.4)
SODIUM SERPL-SCNC: 137 MMOL/L (ref 136–145)
T4 FREE SERPL-MCNC: 0.93 NG/DL (ref 0.7–1.48)
TSH SERPL-ACNC: 3.77 UIU/ML (ref 0.35–4.94)
WBC # SPEC AUTO: 8.76 X10(3)/MCL (ref 4.5–11.5)

## 2023-12-11 PROCEDURE — 84100 ASSAY OF PHOSPHORUS: CPT | Performed by: FAMILY MEDICINE

## 2023-12-11 PROCEDURE — 84134 ASSAY OF PREALBUMIN: CPT | Performed by: FAMILY MEDICINE

## 2023-12-11 PROCEDURE — A4216 STERILE WATER/SALINE, 10 ML: HCPCS | Performed by: FAMILY MEDICINE

## 2023-12-11 PROCEDURE — 25000003 PHARM REV CODE 250: Performed by: FAMILY MEDICINE

## 2023-12-11 PROCEDURE — 86140 C-REACTIVE PROTEIN: CPT | Performed by: FAMILY MEDICINE

## 2023-12-11 PROCEDURE — 84443 ASSAY THYROID STIM HORMONE: CPT | Performed by: FAMILY MEDICINE

## 2023-12-11 PROCEDURE — 82550 ASSAY OF CK (CPK): CPT | Performed by: FAMILY MEDICINE

## 2023-12-11 PROCEDURE — 97161 PT EVAL LOW COMPLEX 20 MIN: CPT

## 2023-12-11 PROCEDURE — 97530 THERAPEUTIC ACTIVITIES: CPT

## 2023-12-11 PROCEDURE — 85652 RBC SED RATE AUTOMATED: CPT | Performed by: FAMILY MEDICINE

## 2023-12-11 PROCEDURE — 11000001 HC ACUTE MED/SURG PRIVATE ROOM

## 2023-12-11 PROCEDURE — 83735 ASSAY OF MAGNESIUM: CPT | Performed by: FAMILY MEDICINE

## 2023-12-11 PROCEDURE — 84439 ASSAY OF FREE THYROXINE: CPT | Performed by: FAMILY MEDICINE

## 2023-12-11 PROCEDURE — 80053 COMPREHEN METABOLIC PANEL: CPT | Performed by: FAMILY MEDICINE

## 2023-12-11 PROCEDURE — 82140 ASSAY OF AMMONIA: CPT | Performed by: FAMILY MEDICINE

## 2023-12-11 PROCEDURE — 63600175 PHARM REV CODE 636 W HCPCS: Performed by: FAMILY MEDICINE

## 2023-12-11 PROCEDURE — 94761 N-INVAS EAR/PLS OXIMETRY MLT: CPT

## 2023-12-11 PROCEDURE — S5010 5% DEXTROSE AND 0.45% SALINE: HCPCS | Performed by: FAMILY MEDICINE

## 2023-12-11 PROCEDURE — 85025 COMPLETE CBC W/AUTO DIFF WBC: CPT | Performed by: FAMILY MEDICINE

## 2023-12-11 PROCEDURE — 82533 TOTAL CORTISOL: CPT | Performed by: FAMILY MEDICINE

## 2023-12-11 RX ORDER — DEXTROSE MONOHYDRATE AND SODIUM CHLORIDE 5; .45 G/100ML; G/100ML
INJECTION, SOLUTION INTRAVENOUS CONTINUOUS
Status: DISCONTINUED | OUTPATIENT
Start: 2023-12-11 | End: 2023-12-13 | Stop reason: HOSPADM

## 2023-12-11 RX ADMIN — DEXTROSE AND SODIUM CHLORIDE: 5; 450 INJECTION, SOLUTION INTRAVENOUS at 03:12

## 2023-12-11 RX ADMIN — SODIUM CHLORIDE, PRESERVATIVE FREE 10 ML: 5 INJECTION INTRAVENOUS at 06:12

## 2023-12-11 RX ADMIN — GENTAMICIN SULFATE 210 MG: 40 INJECTION, SOLUTION INTRAMUSCULAR; INTRAVENOUS at 10:12

## 2023-12-11 RX ADMIN — SODIUM CHLORIDE, PRESERVATIVE FREE 10 ML: 5 INJECTION INTRAVENOUS at 12:12

## 2023-12-11 RX ADMIN — SODIUM CHLORIDE, PRESERVATIVE FREE 10 ML: 5 INJECTION INTRAVENOUS at 09:12

## 2023-12-11 RX ADMIN — FAMOTIDINE 20 MG: 10 INJECTION, SOLUTION INTRAVENOUS at 09:12

## 2023-12-11 RX ADMIN — ENOXAPARIN SODIUM 30 MG: 30 INJECTION SUBCUTANEOUS at 05:12

## 2023-12-11 RX ADMIN — INSULIN ASPART 2 UNITS: 100 INJECTION, SOLUTION INTRAVENOUS; SUBCUTANEOUS at 05:12

## 2023-12-11 RX ADMIN — METOPROLOL TARTRATE 5 MG: 1 INJECTION, SOLUTION INTRAVENOUS at 06:12

## 2023-12-11 RX ADMIN — INSULIN ASPART 2 UNITS: 100 INJECTION, SOLUTION INTRAVENOUS; SUBCUTANEOUS at 01:12

## 2023-12-11 RX ADMIN — DEXTROSE AND SODIUM CHLORIDE: 5; 450 INJECTION, SOLUTION INTRAVENOUS at 12:12

## 2023-12-11 RX ADMIN — METOPROLOL TARTRATE 5 MG: 1 INJECTION, SOLUTION INTRAVENOUS at 12:12

## 2023-12-11 RX ADMIN — FAMOTIDINE 20 MG: 10 INJECTION, SOLUTION INTRAVENOUS at 08:12

## 2023-12-11 RX ADMIN — DOXAZOSIN 2 MG: 2 TABLET ORAL at 08:12

## 2023-12-11 NOTE — PT/OT/SLP EVAL
Physical Therapy Evaluation    Patient Name:  Lilliam Quintanilla   MRN:  [id number]    Recommendations:     Discharge Recommendations: Home Health vs. Hospice  Discharge Equipment Recommendations: hospital bed, lift device   Barriers to discharge: None    Assessment:     Lilliam Quintanilla is a 78 y.o. female admitted with a medical diagnosis of Mental status alteration.  She presents with the following impairments/functional limitations: weakness, impaired endurance .    Rehab Prognosis: Good; patient would benefit from acute skilled PT services to address these deficits and reach maximum level of function.    Recent Surgery: * No surgery found *      Plan:     During this hospitalization, patient to be seen 5 x/week to address the identified rehab impairments via therapeutic activities, therapeutic exercises and progress toward the following goals:    Plan of Care Expires:       Subjective     Chief Complaint: Pt closes eyes, does not follow commands.  Patient/Family Comments/goals: to rtn home  Pain/Comfort:       Patients cultural, spiritual, Baptism conflicts given the current situation:      Living Environment:  Pt lives in [address] home with her 2 sons, 2 steps to enter.  Prior to admission, patients level of function was amb with assistance of her sons HHA.  Equipment used at home: walker, rolling, shower chair, bedside commode.  DME owned (not currently used): none.  Upon discharge, patient will have assistance from sons.    Objective:     Communicated with pt prior to session.  Patient found HOB elevated with    upon PT entry to room.    General Precautions: Standard,    Orthopedic Precautions:    Braces:    Respiratory Status: Room air    Exams:  RLE ROM: WFL except ankles decreased df  LLE ROM: WFL except ankles decreased df    Functional Mobility:  Bed Mobility:     Rolling Left:  total assistance and of 2 persons  Rolling Right: total assistance and of 2 persons  Scooting: total assistance and of 2  persons  Supine to Sit: total assistance and of 2 persons  Sit to Supine: total assistance and of 2 persons      AM-PAC 6 CLICK MOBILITY  Total Score:7       Treatment & Education:  PROM B ankles performed.  Pt sat EOB with support totalAx2 with cues to elicit trunk to hold sitting but pt unable to support herself in sitting or follow commands.    Patient left HOB elevated with all lines intact and call button in reach.    GOALS:   Multidisciplinary Problems       Physical Therapy Goals          Problem: Physical Therapy    Goal Priority Disciplines Outcome Goal Variances Interventions   Physical Therapy Goal     PT, PT/OT Ongoing, Progressing     Description: 1.  Pt will be I HEP with caregivers  2.  Pt will improve bed mob to modA  3.  Pt will improve sit balance to fair  4.  When appropriate, pt will tf to chair modA                       History:     Past Medical History:   Diagnosis Date    Alzheimer's disease, unspecified (CODE)     Urinary tract infection, site not specified        No past surgical history on file.    Time Tracking:     PT Received On: 12/11/23  PT Start Time: 1230     PT Stop Time: 1300  PT Total Time (min): 30 min     Billable Minutes: Evaluation 15 and Therapeutic Activity 15      12/11/2023

## 2023-12-11 NOTE — PROGRESS NOTES
Inpatient Nutrition Assessment    Admit Date: 12/1/2023   Total duration of encounter: 10 days   Patient Age: 78 y.o.    Nutrition Recommendation/Prescription     Recommend continue NGT feedings with Boost Glucose Control @ 40 mL/hr per MD orders.   Continue Free water Flush of 60 mL every 12 hours per MD orders.   Daily weight.   Oral diet advancement per MD.   Monitor intake, weight, and labs.       RD following and available as needed. Thank you.     Communication of Recommendations: reviewed with nurse and EMR    Nutrition Assessment     Malnutrition Assessment/Nutrition-Focused Physical Exam    Malnutrition in the context of acute illness or injury  Degree of Malnutrition: severe malnutrition  Energy Intake: </= 50% of estimated energy requirement for >/= 5 days Prior to admit.   Interpretation of Weight Loss: unable to obtain  Body Fat:moderate depletion  Area of Body Fat Loss: orbital region , upper arm region - triceps / biceps, and thoracic and lumbar region - ribs, lower back, midaxillary line  Muscle Mass Loss: moderate depletion  Area of Muscle Mass Loss: temple region - temporalis muscle, clavicle bone region - pectoralis major, deltoid, trapezius muscles, clavicle and acromion bone region - deltoid muscle, and scapular bone region - trapezius, supraspinus, infraspinus muscles  Fluid Accumulation: unable to obtain  Edema: does not meet criteria   Reduced  Strength: unable to obtain  A minimum of two characteristics is recommended for diagnosis of either severe or non-severe malnutrition.    Chart Review    Reason Seen: continuous nutrition monitoring and follow-up.    Malnutrition Screening Tool Results   Have you recently lost weight without trying?: No  Have you been eating poorly because of a decreased appetite?: No   MST Score: 0   Diagnosis:  1. Urinary tract infection with systemic inflammatory response syndrome.  2. Hematuria.  3. End-stage dementia with decreased mental status.  4.  "Debility.  5. Malnutrition.  6. Uncontrolled hypertension.  7. Elevated glucose.  8. Multiple other medical problems as per problem list.    Relevant Medical History:   Alzheimer's Disease.  UTI.     Nutrition-Related Medications:   IV Fluids: D5W and 0.45% NaCl @ 65 mL/hr.     Calorie Containing IV Medications: no significant kcals from medications at this time    Nutrition-Related Labs:  12/11: BUN 29(H); (H); Alb 2.0(L); AST 55(H); H/H 11.1/34.7(L).   12/6: (H).   12/5: (H); BUN 21(H); K+ 2.7(L); Phos 2.1(L); Alb 2.5(L); PreAlb 9.1(L).    Nutrition Orders:   Diet NPO      Appetite/Oral Intake: NPO/NPO    Factors Affecting Nutritional Intake: impaired cognitive status/motor control and decreased appetite    Food/Restorationist/Cultural Preferences: none reported    Food Allergies: none reported    Wound(s):       Last Bowel Movement: 12/11/23    Comments  12/11: TPN discontinued. Pt currently NPO and receiving Boost Glucose Control via NGT @ 40 mL/hr with FWF of 60 mL/hr per MD orders. Pt was more alert today per nursing; however, was not very alert at time of rounds. Son at bedside. Nursing reports plans are for pt to go home with Hospice. No new weight. Labs and meds reviewed. GLU better controlled. Will continue to monitor during stay.       12/6: Pt with End Stage Dementia, poor appetite and intake. Muscle and fat wasting noted. Currently receiving TPN: ClinimixE 5/20%@50mL/hr. Labs and meds reviewed. Discussed with Pharm D. Will continue to monitor during stay.     Anthropometrics    Height: 5' 4.02" (162.6 cm)    Last Weight: 52.5 kg (115 lb 11.9 oz) (12/05/23 0614) Weight Method: Bed Scale  BMI (Calculated): 19.9  BMI Classification: underweight (BMI less than 22 if >65 years of age)     Ideal Body Weight (IBW), Female: 120.1 lb     % Ideal Body Weight, Female (lb): 96.63 %                             Usual Weight Provided By: unable to obtain usual weight    Wt Readings from Last 5 " Encounters:   23 52.5 kg (115 lb 11.9 oz)     Weight Change(s) Since Admission:   Wt Readings from Last 1 Encounters:   23 0614 52.5 kg (115 lb 11.9 oz)   23 1550 52.6 kg (115 lb 15.4 oz)   Admit Weight: 52.6 kg (115 lb 15.4 oz) (23 1550), Weight Method: Bed Scale    Estimated Needs    Weight Used For Calorie Calculations: 52.5 kg (115 lb 11.9 oz)  Energy Calorie Requirements (kcal): 1312 to 1575 kcals/day (25 to 30 kcals/kg/CBW).  Energy Need Method: Kcal/kg  Weight Used For Protein Calculations: 52 kg (114 lb 10.2 oz)  Protein Requirements: 60 g/day (1.2 g/kg/CBW).  Fluid Requirements (mL): 1312 mL/day (1mL/kcal).  Temp (24hrs), Av.1 °F (36.7 °C), Min:97.8 °F (36.6 °C), Max:98.8 °F (37.1 °C)       Enteral Nutrition    Formula:  Boost Glucose Control.  Rate/Volume: 40 mL/hr.   Water Flushes: 60 mL every 12 hours.   Additives/Modulars: none at this time  Route: nasogastric tube  Method: continuous  Total Nutrition Provided by Tube Feeding, Additives, and Flushes:  Calories Provided  760 kcal/d, 58% needs   Protein Provided  64 g/d, 100% needs   Fluid Provided  1440 ml/d, 100% needs   Continuous feeding calculations based on estimated 20 hr/d run time unless otherwise stated.    Parenteral Nutrition - Discontinued.     Standard Formula: Clinimix E   Custom Formula: not applicable  Additives: none  Rate/Volume: 50 mL/hr.   Lipids: none  Total Nutrition Provided by Parenteral Nutrition:  Calories Provided  1056 kcal/d, 80% needs   Protein Provided  60 g/d, 100% needs   Dextrose Provided  240 g/d, GIR 3.17 mg CHO/kg/min   Fluid Provided  1200 ml/d, 92% needs       Evaluation of Received Nutrient Intake    Calories: not meeting estimated needs  Protein: meeting estimated needs    Patient Education    Not applicable.    Nutrition Diagnosis     PES: Malnutrition related to  Current Condition as evidenced by Muscle and fat wasting; </= 50% Estimated Energy Requirements >/= 1 week prior to  admit. (active)    Interventions/Goals     Intervention(s): modified composition of parenteral nutrition, modified rate of parenteral nutrition, and collaboration with other providers    Goal: Meet greater than 75% of nutritional needs by follow-up. (goal progressing)    Monitoring & Evaluation     Dietitian will monitor energy intake, parenteral nutrition intake, weight, weight change, electrolyte/renal panel, glucose/endocrine profile, and gastrointestinal profile.    Nutrition Risk/Follow-Up: moderate (follow-up in 3-5 days)   Please consult if re-assessment needed sooner.

## 2023-12-11 NOTE — PLAN OF CARE
Problem: Adult Inpatient Plan of Care  Goal: Plan of Care Review  Outcome: Ongoing, Progressing  Goal: Patient-Specific Goal (Individualized)  Outcome: Ongoing, Progressing  Goal: Absence of Hospital-Acquired Illness or Injury  Outcome: Ongoing, Progressing  Goal: Optimal Comfort and Wellbeing  Outcome: Ongoing, Progressing  Goal: Readiness for Transition of Care  Outcome: Ongoing, Progressing     Problem: Fall Injury Risk  Goal: Absence of Fall and Fall-Related Injury  Outcome: Ongoing, Progressing     Problem: UTI (Urinary Tract Infection)  Goal: Improved Infection Symptoms  Outcome: Ongoing, Progressing

## 2023-12-11 NOTE — PLAN OF CARE
Spoke to son José Antonio about choice of hospice.  He wants to use [facility]. Referral sent with José Antonio phone # [phone].

## 2023-12-11 NOTE — NURSING
PER DR. GREEN STOP LEVEMIR, KEEP MOD. SLIDING SCALE,  PT TO EVAL AND TREAT, PATIENT TO GET UP IN CHAIR, AND  INCREASE FEEDINGS TO 40 ML/HR. VORB: DR. GREEN / HAIM PUENTES

## 2023-12-12 LAB
CORTIS SERPL IA-MCNC: 25 MCG/DL
INR PPP: 1.3
POCT GLUCOSE: 187 MG/DL (ref 70–110)
POCT GLUCOSE: 216 MG/DL (ref 70–110)
POCT GLUCOSE: 237 MG/DL (ref 70–110)
POCT GLUCOSE: 253 MG/DL (ref 70–110)
PREALB SERPL-MCNC: 12.4 MG/DL (ref 14–37)
PROTHROMBIN TIME: 16.2 SECONDS (ref 12.5–14.5)
TRIGL SERPL-MCNC: 176 MG/DL (ref 37–140)

## 2023-12-12 PROCEDURE — 63600175 PHARM REV CODE 636 W HCPCS: Performed by: FAMILY MEDICINE

## 2023-12-12 PROCEDURE — A4216 STERILE WATER/SALINE, 10 ML: HCPCS | Performed by: FAMILY MEDICINE

## 2023-12-12 PROCEDURE — 25000003 PHARM REV CODE 250: Performed by: FAMILY MEDICINE

## 2023-12-12 PROCEDURE — 94761 N-INVAS EAR/PLS OXIMETRY MLT: CPT

## 2023-12-12 PROCEDURE — 85610 PROTHROMBIN TIME: CPT | Performed by: FAMILY MEDICINE

## 2023-12-12 PROCEDURE — S5010 5% DEXTROSE AND 0.45% SALINE: HCPCS | Performed by: FAMILY MEDICINE

## 2023-12-12 PROCEDURE — 84478 ASSAY OF TRIGLYCERIDES: CPT | Performed by: FAMILY MEDICINE

## 2023-12-12 PROCEDURE — 84134 ASSAY OF PREALBUMIN: CPT | Performed by: FAMILY MEDICINE

## 2023-12-12 PROCEDURE — 11000001 HC ACUTE MED/SURG PRIVATE ROOM

## 2023-12-12 RX ADMIN — ASPIRIN 81 MG CHEWABLE TABLET 81 MG: 81 TABLET CHEWABLE at 09:12

## 2023-12-12 RX ADMIN — METOPROLOL TARTRATE 5 MG: 1 INJECTION, SOLUTION INTRAVENOUS at 12:12

## 2023-12-12 RX ADMIN — SODIUM CHLORIDE, PRESERVATIVE FREE 10 ML: 5 INJECTION INTRAVENOUS at 05:12

## 2023-12-12 RX ADMIN — SODIUM CHLORIDE, PRESERVATIVE FREE 10 ML: 5 INJECTION INTRAVENOUS at 02:12

## 2023-12-12 RX ADMIN — GENTAMICIN SULFATE 210 MG: 40 INJECTION, SOLUTION INTRAMUSCULAR; INTRAVENOUS at 09:12

## 2023-12-12 RX ADMIN — VALSARTAN 80 MG: 80 TABLET, FILM COATED ORAL at 09:12

## 2023-12-12 RX ADMIN — ENOXAPARIN SODIUM 30 MG: 30 INJECTION SUBCUTANEOUS at 05:12

## 2023-12-12 RX ADMIN — METOPROLOL TARTRATE 5 MG: 1 INJECTION, SOLUTION INTRAVENOUS at 06:12

## 2023-12-12 RX ADMIN — SODIUM CHLORIDE, PRESERVATIVE FREE 10 ML: 5 INJECTION INTRAVENOUS at 12:12

## 2023-12-12 RX ADMIN — FAMOTIDINE 20 MG: 10 INJECTION, SOLUTION INTRAVENOUS at 09:12

## 2023-12-12 RX ADMIN — INSULIN ASPART 3 UNITS: 100 INJECTION, SOLUTION INTRAVENOUS; SUBCUTANEOUS at 12:12

## 2023-12-12 RX ADMIN — INSULIN ASPART 6 UNITS: 100 INJECTION, SOLUTION INTRAVENOUS; SUBCUTANEOUS at 06:12

## 2023-12-12 RX ADMIN — DOXAZOSIN 2 MG: 2 TABLET ORAL at 09:12

## 2023-12-12 RX ADMIN — DEXTROSE AND SODIUM CHLORIDE: 5; 450 INJECTION, SOLUTION INTRAVENOUS at 06:12

## 2023-12-12 RX ADMIN — SERTRALINE HYDROCHLORIDE 150 MG: 50 TABLET ORAL at 09:12

## 2023-12-12 RX ADMIN — INSULIN ASPART 4 UNITS: 100 INJECTION, SOLUTION INTRAVENOUS; SUBCUTANEOUS at 05:12

## 2023-12-12 RX ADMIN — DEXTROSE AND SODIUM CHLORIDE: 5; 450 INJECTION, SOLUTION INTRAVENOUS at 09:12

## 2023-12-12 NOTE — PLAN OF CARE
Problem: Adult Inpatient Plan of Care  Goal: Plan of Care Review  Outcome: Ongoing, Progressing  Flowsheets (Taken 12/12/2023 1750)  Plan of Care Reviewed With:   patient   son  Goal: Patient-Specific Goal (Individualized)  Outcome: Ongoing, Progressing  Goal: Absence of Hospital-Acquired Illness or Injury  Outcome: Ongoing, Progressing  Intervention: Identify and Manage Fall Risk  Flowsheets (Taken 12/12/2023 1750)  Safety Promotion/Fall Prevention:   assistive device/personal item within reach   lighting adjusted   high risk medications identified   in recliner, wheels locked   medications reviewed   nonskid shoes/socks when out of bed   muscle strengthening facilitated   family to remain at bedside   side rails raised x 2  Intervention: Prevent Skin Injury  Flowsheets (Taken 12/12/2023 1750)  Body Position:   position maintained   head facing, right   right  Skin Protection:   adhesive use limited   incontinence pads utilized  Intervention: Prevent and Manage VTE (Venous Thromboembolism) Risk  Flowsheets (Taken 12/12/2023 1750)  Activity Management: Rolling - L1  VTE Prevention/Management:   remove, assess skin, and reapply sequential compression device   ambulation promoted   fluids promoted   intravenous hydration   ROM (active) performed  Range of Motion: active ROM (range of motion) encouraged  Intervention: Prevent Infection  Flowsheets (Taken 12/12/2023 1750)  Infection Prevention:   cohorting utilized   environmental surveillance performed   equipment surfaces disinfected   hand hygiene promoted   personal protective equipment utilized   rest/sleep promoted   single patient room provided  Goal: Optimal Comfort and Wellbeing  Outcome: Ongoing, Progressing  Intervention: Monitor Pain and Promote Comfort  Flowsheets (Taken 12/12/2023 1750)  Pain Management Interventions:   care clustered   relaxation techniques promoted  Intervention: Provide Person-Centered Care  Flowsheets (Taken 12/12/2023 1750)  Trust  Relationship/Rapport:   care explained   questions encouraged   thoughts/feelings acknowledged  Goal: Readiness for Transition of Care  Outcome: Ongoing, Progressing     Problem: Fall Injury Risk  Goal: Absence of Fall and Fall-Related Injury  Outcome: Ongoing, Progressing  Intervention: Identify and Manage Contributors  Flowsheets (Taken 12/12/2023 1750)  Self-Care Promotion: independence encouraged  Medication Review/Management: medications reviewed  Intervention: Promote Injury-Free Environment  Flowsheets (Taken 12/12/2023 1750)  Safety Promotion/Fall Prevention:   assistive device/personal item within reach   lighting adjusted   high risk medications identified   in recliner, wheels locked   medications reviewed   nonskid shoes/socks when out of bed   muscle strengthening facilitated   family to remain at bedside   side rails raised x 2     Problem: UTI (Urinary Tract Infection)  Goal: Improved Infection Symptoms  Outcome: Ongoing, Progressing  Intervention: Prevent Infection Progression  Flowsheets (Taken 12/12/2023 1750)  Fever Reduction/Comfort Measures: lightweight bedding  Infection Management: aseptic technique maintained  Intervention: Facilitate Optimal Urinary Elimination  Flowsheets (Taken 12/12/2023 1750)  Urinary Elimination Promotion: catheter patency maintained     Problem: Pain Acute  Goal: Acceptable Pain Control and Functional Ability  Outcome: Ongoing, Progressing  Intervention: Develop Pain Management Plan  Flowsheets (Taken 12/12/2023 1750)  Pain Management Interventions:   care clustered   relaxation techniques promoted  Intervention: Prevent or Manage Pain  Flowsheets (Taken 12/12/2023 1750)  Sleep/Rest Enhancement:   noise level reduced   relaxation techniques promoted  Sensory Stimulation Regulation:   care clustered   television on  Medication Review/Management: medications reviewed  Intervention: Optimize Psychosocial Wellbeing  Flowsheets (Taken 12/12/2023 1750)  Supportive  Measures:   relaxation techniques promoted   self-care encouraged  Diversional Activities: television     Problem: Infection  Goal: Absence of Infection Signs and Symptoms  Outcome: Ongoing, Progressing  Intervention: Prevent or Manage Infection  Flowsheets (Taken 12/12/2023 1750)  Fever Reduction/Comfort Measures: lightweight bedding  Infection Management: aseptic technique maintained  Isolation Precautions: precautions maintained     Problem: Skin Injury Risk Increased  Goal: Skin Health and Integrity  Outcome: Ongoing, Progressing  Intervention: Optimize Skin Protection  Flowsheets (Taken 12/12/2023 1750)  Pressure Reduction Techniques: frequent weight shift encouraged  Skin Protection:   adhesive use limited   incontinence pads utilized  Head of Bed (HOB) Positioning: HOB at 45 degrees  Intervention: Promote and Optimize Oral Intake  Flowsheets (Taken 12/12/2023 1750)  Oral Nutrition Promotion: rest periods promoted

## 2023-12-12 NOTE — PROGRESS NOTES
[facility]                     [address] [address]    PATIENT NAME:       MANDI YODER  YOB: 1945  CSN:                [id number]   MRN:                [id number]  ADMIT DATE:         12/01/2023 15:09:00  PHYSICIAN:          Adria Reyes MD                            PROGRESS NOTE    DATE:      SUBJECTIVE:  She does have her eyes open today.  We had placed an NG tube   yesterday and this is in her right nare.  We feeding her boost with glucose   control per the NG tube.  TPN was stopped and her blood sugars are doing much   better.  We had to place an NG tube also, so that we can get a CAT scan of her   abdomen and pelvis with and without contrast.  This shows suspicion for a   bladder tumor.  Her hematuria has improved today.  Her sons are at bedside.  We   all agree that after hospitalization especially if we can get her just a little   bit stronger and more alert, that we will discharge her with hospice.    PHYSICAL EXAMINATION:  VITAL SIGNS:  The patient's vitals are stable.  She is afebrile.  Pulse ox is   satisfactory on room air.  HEART:  Regular rate and rhythm.    LUNGS:  Clear to auscultation bilaterally.    ABDOMEN:  Nontender, nondistended, normoactive bowel sounds.  EXTREMITIES:  No significant edema.  She does have a lot of muscle wasting.    ASSESSMENT:    1. Malnutrition, being fed by NG tuber.  We had her on TPN, but her blood sugar   went up to high.  2. Diabetic.  3. Severe decrease in mental status with underlying dementia-end-stage.  4. Severe UTI with hematuria and SIRS.  5. Bladder mass.  6. Multiple other medical problems as per problem list.    PLAN:  Continue current care and continue feeding via NG tube.  Continue to   control blood sugar.  Discontinue room air and increase her NG tube feedings to   40 mL/hour.  Follow up in the  morning.      ______________________________  MD DASHA Khanna/SUSAN  DD:  12/11/2023  Time:  05:32PM  DT:  12/11/2023  Time:  06:10PM  Job #:  [id number]      PROGRESS NOTE

## 2023-12-13 VITALS
WEIGHT: 129.44 LBS | RESPIRATION RATE: 20 BRPM | DIASTOLIC BLOOD PRESSURE: 79 MMHG | HEIGHT: 64 IN | TEMPERATURE: 98 F | OXYGEN SATURATION: 98 % | BODY MASS INDEX: 22.1 KG/M2 | SYSTOLIC BLOOD PRESSURE: 138 MMHG | HEART RATE: 67 BPM

## 2023-12-13 PROBLEM — I10 PRIMARY HYPERTENSION: Status: ACTIVE | Noted: 2023-12-13

## 2023-12-13 PROBLEM — C67.9 BLADDER CANCER: Status: ACTIVE | Noted: 2023-12-13

## 2023-12-13 PROBLEM — E43 SEVERE MALNUTRITION: Status: ACTIVE | Noted: 2023-12-13

## 2023-12-13 LAB
POCT GLUCOSE: 181 MG/DL (ref 70–110)
POCT GLUCOSE: 208 MG/DL (ref 70–110)
POCT GLUCOSE: 227 MG/DL (ref 70–110)
POCT GLUCOSE: 232 MG/DL (ref 70–110)

## 2023-12-13 PROCEDURE — A4216 STERILE WATER/SALINE, 10 ML: HCPCS | Performed by: FAMILY MEDICINE

## 2023-12-13 PROCEDURE — 94761 N-INVAS EAR/PLS OXIMETRY MLT: CPT

## 2023-12-13 PROCEDURE — 63600175 PHARM REV CODE 636 W HCPCS: Performed by: FAMILY MEDICINE

## 2023-12-13 PROCEDURE — 25000003 PHARM REV CODE 250: Performed by: FAMILY MEDICINE

## 2023-12-13 RX ORDER — ACETAMINOPHEN 325 MG/1
650 TABLET ORAL EVERY 4 HOURS PRN
Refills: 0
Start: 2023-12-13

## 2023-12-13 RX ORDER — NAPROXEN SODIUM 220 MG/1
81 TABLET, FILM COATED ORAL DAILY
Refills: 0
Start: 2023-12-14 | End: 2024-12-13

## 2023-12-13 RX ORDER — DOXAZOSIN 2 MG/1
2 TABLET ORAL NIGHTLY
Qty: 30 TABLET | Refills: 11 | Status: SHIPPED | OUTPATIENT
Start: 2023-12-13 | End: 2024-12-12

## 2023-12-13 RX ORDER — CLONIDINE 0.2 MG/24H
2 PATCH, EXTENDED RELEASE TRANSDERMAL
Qty: 8 PATCH | Refills: 11 | Status: SHIPPED | OUTPATIENT
Start: 2023-12-17 | End: 2024-12-16

## 2023-12-13 RX ORDER — RIVASTIGMINE 4.6 MG/24H
1 PATCH, EXTENDED RELEASE TRANSDERMAL DAILY
Qty: 30 PATCH | Refills: 11 | Status: SHIPPED | OUTPATIENT
Start: 2023-12-13 | End: 2024-12-12

## 2023-12-13 RX ADMIN — INSULIN ASPART 4 UNITS: 100 INJECTION, SOLUTION INTRAVENOUS; SUBCUTANEOUS at 11:12

## 2023-12-13 RX ADMIN — METOPROLOL TARTRATE 5 MG: 1 INJECTION, SOLUTION INTRAVENOUS at 12:12

## 2023-12-13 RX ADMIN — ASPIRIN 81 MG CHEWABLE TABLET 81 MG: 81 TABLET CHEWABLE at 09:12

## 2023-12-13 RX ADMIN — INSULIN ASPART 4 UNITS: 100 INJECTION, SOLUTION INTRAVENOUS; SUBCUTANEOUS at 05:12

## 2023-12-13 RX ADMIN — INSULIN ASPART 2 UNITS: 100 INJECTION, SOLUTION INTRAVENOUS; SUBCUTANEOUS at 12:12

## 2023-12-13 RX ADMIN — SODIUM CHLORIDE, PRESERVATIVE FREE 10 ML: 5 INJECTION INTRAVENOUS at 05:12

## 2023-12-13 RX ADMIN — METOPROLOL TARTRATE 5 MG: 1 INJECTION, SOLUTION INTRAVENOUS at 05:12

## 2023-12-13 RX ADMIN — FAMOTIDINE 20 MG: 10 INJECTION, SOLUTION INTRAVENOUS at 08:12

## 2023-12-13 RX ADMIN — VALSARTAN 80 MG: 80 TABLET, FILM COATED ORAL at 09:12

## 2023-12-13 RX ADMIN — SERTRALINE HYDROCHLORIDE 150 MG: 50 TABLET ORAL at 09:12

## 2023-12-13 NOTE — PLAN OF CARE
12/13/23 1309   Final Note   Assessment Type Final Discharge Note   Anticipated Discharge Disposition [facility] Resources/Appts/Education Provided Post-Acute resouces added to AVS   Post-Acute Status   Post-Acute Authorization Hospice   Post-Acute Placement Status Set-up Complete/Auth obtained   Hospice Status Set-up Complete/Auth obtained   Discharge Delays None known at this time     D/c info sent to [facility]. Pt will d/c as soon as bed is at the home, by ERNIE.

## 2023-12-13 NOTE — NURSING
Ms. Kerrimartita Montenegro from [facility] came and spoke with pt's son about getting furniture to their home tomorrow at 10am  and pt being discharged into their care.

## 2023-12-13 NOTE — NURSING
Pt was discharged today and is going home with [facility].Notified home health of discharge. Educated the pt's son (José Antonio) via avs in regards to medication changes,follow up appts,and clinical documents. Pt had no further questions at this time. It was a pleasure taking care of the pt.

## 2023-12-14 NOTE — PROGRESS NOTES
[facility]                     [address] [address]    PATIENT NAME:       MANDI YODER  YOB: 1945  CSN:                [id number]   MRN:                [id number]  ADMIT DATE:         12/01/2023 15:09:00  PHYSICIAN:          Adria Reyes MD                            PROGRESS NOTE    DATE:  12/08/2023 00:00:00    SUBJECTIVE:  Ms. Vyas is hospitalized secondary to decrease in her mental   status.  She had a UTI with SIRS and hematuria.  Son is at bedside.  She is   doing well otherwise except we started TPN and she is having elevated blood   sugars.    PHYSICAL EXAMINATION:  VITAL SIGNS:  She is afebrile.  Vital signs are stable.  Pulse ox is 95% on room   air.  Accu-Cheks are increased.  Labs, x-rays, medications are on the chart and   reviewed.  HEART:  Regular rate and rhythm.    LUNGS:  Clear to auscultation bilaterally.  ABDOMEN:  Soft, nontender.  EXTREMITIES:  No significant edema.  She does have a lot of muscle wasting and   is obviously malnourished.  She does not respond to verbal stimuli.  She has her   mouth open and her eyes closed.  She does not appear to be in any distress.    ASSESSMENT:    1. Mental status changes with underlying end-stage dementia.  2. UTI with SIRS.  3. Elevated LFTs.  4. Elevated glucose with malnutrition, currently receiving TPN.  5. Low potassium.  6. Low sodium.  7. Anemia.  8. Severe debility.  9. DNR.  10. Multiple other medical problems as per problem list.  11. Hematuria.    PLAN:  Start Levemir to help control the blood sugars.  Continue moderate dosing   sliding scale of insulin.  Get an ultrasound of her liver and abdomen.  Get a   urine culture to assure adequate treatment with her IV antibiotics.        ______________________________  Adria Reyes MD    PBS/AQS  DD:  12/14/2023  Time:  11:54AM  DT:  12/14/2023  Time:  12:53PM  Job #:   [id number]      PROGRESS NOTE

## 2023-12-14 NOTE — PROGRESS NOTES
[facility]                     [address] [address]    PATIENT NAME:       MANDI YODER  YOB: 1945  CSN:                [id number]   MRN:                [id number]  ADMIT DATE:         12/01/2023 15:09:00  PHYSICIAN:          Adria Reyes MD                            PROGRESS NOTE    DATE:  12/12/2023 00:00:00    SUBJECTIVE:  She remains in the hospital secondary to her UTI with hematuria.    She has been semicomatose.  She is now opening her eyes more.  She had a CT of   her abdomen and pelvis showing a probable bladder cancer.  Her hematuria has   greatly improved however.  She appears very tired and confused.  Her son is at   the bedside and we made agree to get hospice to evaluate her.    PHYSICAL EXAMINATION:  VITAL SIGNS:  Stable.  She is afebrile.  HEART:  Regular rate and rhythm.    LUNGS:  Clear to auscultation bilaterally.  ABDOMEN:  Soft.  EXTREMITIES:  Muscular wasting, but no edema.    ASSESSMENT:    1. End-stage dementia with decreased mental status secondary to her infection   and debility.  She has also had a lot of progression recently with her dementia.  2. Malnutrition.  She is n.p.o.  We placed a NG tube for her oral contrast for   her CT and we are feeding her through this as well.  3. Elevated glucose secondary TPN-improving.  TPN has been discontinued.  4. Hypertension, currently controlled with patches of clonidine.  5. Anemia.  6. Low potassium and sodium.  7. Low albumin.  8. Hematuria with UTI with SIRS-improving.  9. Bladder cancer.  10. DNR.    PLAN:  We will discuss with hospice and hopefully discharge tomorrow.  So that   she can go home and be comfortable.  Continue care for now.  Continue feedings   through her NG tube.        ______________________________  Adria Reyes MD    PBS/AQS  DD:  12/14/2023  Time:  11:13AM  DT:  12/14/2023  Time:  12:44PM  Job  #:  [id number]      PROGRESS NOTE

## 2023-12-14 NOTE — PROGRESS NOTES
[facility]                     [address] [address]    PATIENT NAME:       MANDI YODER  YOB: 1945  CSN:                [id number]   MRN:                [id number]  ADMIT DATE:         12/01/2023 15:09:00  PHYSICIAN:          Adria Reyes MD                            PROGRESS NOTE    DATE:  12/09/2023 00:00:00    SUBJECTIVE:  She was admitted with UTI with hematuria and decreased mental   status.  Since then, she has been very lethargic/semi comatose.  She is   malnourished.  She has not been eating.  Her blood sugars are elevated, however,   since she is on TPN.    PHYSICAL EXAMINATION:  GENERAL:  She is afebrile.  VITAL SIGNS:  Stable.  Pulse ox 96% on room air.  Accu-Chek 279 at last check.    Labs, x-rays, medications are on the chart and reviewed.  HEART:  Regular rate and rhythm.    LUNGS:  Clear to auscultation bilaterally.    ABDOMEN:  Nontender, nondistended, normoactive bowel sounds.  EXTREMITIES:  No significant edema.  No significant change.  No change in her   mental status either.    ASSESSMENT:    1. UTI with SIRS.  2. Hematuria.  3. Diminished mental status with underlying end-stage dementia.  4. Elevated blood sugars.  5. DNR.  6. Multiple other medical problems as per problem list.    PLAN:  Continue current care and follow up in the morning.  Increase her Levemir   and recheck a.m. labs.        ______________________________  Adria Reyes MD    PBS/AQS  DD:  12/14/2023  Time:  11:56AM  DT:  12/14/2023  Time:  01:03PM  Job #:  [id number]      PROGRESS NOTE

## 2023-12-21 NOTE — DISCHARGE SUMMARY
[facility]                     [address] [facility] [address]    PATIENT NAME:       MANDI YODER  YOB: 1945  CSN:                [id number]   MRN:                [id number]  ADMIT DATE:         12/01/2023 15:09:00  PHYSICIAN:          Adria Reyes MD                          DISCHARGE SUMMARY    DATE OF DISCHARGE:  12/13/2023 14:55:00    HOSPITAL COURSE:  She was admitted to [facility] on the December 1st, discharged on December 13th.  She had decreased mental status and remained   mostly in a semi comatose stage or state throughout hospitalization.  She had   malnutrition and we did give her some TPN.  A course of blood sugars were very   high.  This was changed to boost with glucose control.  Her NG tube and glucose   was better controlled using long-acting and short-acting insulin.  She had a UTI   requiring IV antibiotic treatment.  This was done when she had hematuria.  When   the hematuria failed to resolve, she was changed to gentamicin and her   hematuria did resolve somewhat.  CAT scan of her abdomen and pelvis showed   possible margins are to probable bladder cancer.  Toward the end of her   hospitalization, she will open her eyes.  She was not following commands,   however, and she was bed-bound.  Hospice was consulted and she was sent home   with hospice.  We decided not to pursue treatment of her bladder cancer.  We did   continue her NG tube and a Evans catheter in order to feed her and give her   medications.  She has hypertension uncontrolled during hospitalization.  We gave   her clonidine patches for this and IV medications.    The patient also had a fall at home, fracturing her 3rd metatarsal left hand.    This was wrapped.  No further treatment was needed for this.  She had a low   potassium, severe debility of course, and she is bed-bound.  During her    hospitalization, family was at bedside.  Her son, José Antonio was there and also   Jere.  They are in agreement with hospice and DNR status.    ASSESSMENT:    1. Urinary tract infection with systemic inflammatory response syndrome.  2. Hematuria.  3. End-stage dementia with decreased mental status on admit.  4. Severe debility.  5. Malnutrition.  6. Uncontrolled hypertension.  7. Uncontrolled glucose.  8. Fracture of her 3rd metatarsal of her left hand.  9. Newly diagnosed bladder cancer.  10. Urinary tract infection.  11. DNR status.    PLAN:  Discharge the patient to home with hospice.  Continue Tylenol as needed   for pain or fever.  Continue Zofran 4 mg every 6 hours as needed for nausea and   vomiting.  Meclizine was discontinued today.  She is not getting out of bed.    Continue aspirin 81 mg 1 p.o. daily, clonidine patch 0.4 mg per 24 hours to her   back every 24 hours.  Aricept 10 mg down NG-tube, however, evening, we did start   an Exelon patch at 4.6 mg daily, doxazosin 2 mg down NG tube once a day.    Lipitor was discontinued, gabapentin discontinued, and Seroquel was   discontinued.  Activity as tolerated.  She was bedbound.  Continue dysphasia and   puree diet and supplemental feedings as per hospitalization.  PEG tube change   as the NG tube.        ______________________________  Adria Reyes MD    PBS/AQS  DD:  12/21/2023  Time:  11:58AM  DT:  12/21/2023  Time:  02:32PM  Job #:  [id number]      DISCHARGE SUMMARY

## 2024-03-04 PROBLEM — N39.0 UTI (URINARY TRACT INFECTION): Status: RESOLVED | Noted: 2023-12-01 | Resolved: 2024-03-04
